# Patient Record
Sex: FEMALE | Race: WHITE | Employment: FULL TIME | URBAN - METROPOLITAN AREA
[De-identification: names, ages, dates, MRNs, and addresses within clinical notes are randomized per-mention and may not be internally consistent; named-entity substitution may affect disease eponyms.]

---

## 2018-03-16 ENCOUNTER — HOSPITAL ENCOUNTER (OUTPATIENT)
Dept: GENERAL RADIOLOGY | Age: 43
Discharge: HOME | End: 2018-03-18
Payer: COMMERCIAL

## 2018-03-16 DIAGNOSIS — Z12.31 VISIT FOR SCREENING MAMMOGRAM: ICD-10-CM

## 2018-03-16 PROCEDURE — 77063 BREAST TOMOSYNTHESIS BI: CPT

## 2019-04-25 ENCOUNTER — HOSPITAL ENCOUNTER (OUTPATIENT)
Dept: GENERAL RADIOLOGY | Age: 44
Discharge: HOME | End: 2019-04-27
Payer: COMMERCIAL

## 2019-04-25 DIAGNOSIS — Z12.39 SCREENING BREAST EXAMINATION: ICD-10-CM

## 2019-04-25 PROCEDURE — 77063 BREAST TOMOSYNTHESIS BI: CPT

## 2019-04-26 ENCOUNTER — TELEPHONE (OUTPATIENT)
Dept: ONCOLOGY | Age: 44
End: 2019-04-26

## 2019-04-26 NOTE — TELEPHONE ENCOUNTER
Call to patient in reference to her mammogram performed at [facility] on April 25, 2019. Instructed patient that the radiologist has recommended some additional breast imaging, in order to make a final determination/result. Informed her that a request for Rx has been faxed to the ordering physician. Once we receive the script a  from [facility] will contact her to schedule the additional imaging study/studies. Verbalizes understanding and is agreeable to proceed.        Lindsay Alejandre, RN, BSN, [address]

## 2019-05-03 ENCOUNTER — HOSPITAL ENCOUNTER (OUTPATIENT)
Dept: GENERAL RADIOLOGY | Age: 44
Discharge: HOME | End: 2019-05-05
Payer: COMMERCIAL

## 2019-05-03 DIAGNOSIS — R92.8 ABNORMAL MAMMOGRAM: ICD-10-CM

## 2019-05-03 PROCEDURE — 76642 ULTRASOUND BREAST LIMITED: CPT

## 2020-06-04 ENCOUNTER — HOSPITAL ENCOUNTER (OUTPATIENT)
Dept: GENERAL RADIOLOGY | Age: 45
Discharge: HOME | End: 2020-06-06
Payer: COMMERCIAL

## 2020-06-04 PROCEDURE — 77063 BREAST TOMOSYNTHESIS BI: CPT

## 2020-12-24 ENCOUNTER — HOSPITAL ENCOUNTER (EMERGENCY)
Age: 45
Discharge: HOME | End: 2020-12-24
Attending: EMERGENCY MEDICINE
Payer: COMMERCIAL

## 2020-12-24 ENCOUNTER — APPOINTMENT (OUTPATIENT)
Dept: CT IMAGING | Age: 45
End: 2020-12-24
Payer: COMMERCIAL

## 2020-12-24 ENCOUNTER — APPOINTMENT (OUTPATIENT)
Dept: GENERAL RADIOLOGY | Age: 45
End: 2020-12-24
Payer: COMMERCIAL

## 2020-12-24 VITALS
OXYGEN SATURATION: 96 % | SYSTOLIC BLOOD PRESSURE: 119 MMHG | RESPIRATION RATE: 17 BRPM | HEART RATE: 79 BPM | TEMPERATURE: 98 F | HEIGHT: 66 IN | DIASTOLIC BLOOD PRESSURE: 84 MMHG | WEIGHT: 173 LBS | BODY MASS INDEX: 27.8 KG/M2

## 2020-12-24 LAB
ALBUMIN SERPL-MCNC: 4.5 G/DL (ref 3.5–5.2)
ALP BLD-CCNC: 81 U/L (ref 35–104)
ALT SERPL-CCNC: 6 U/L (ref 0–32)
AMPHETAMINE SCREEN, URINE: NOT DETECTED
ANION GAP SERPL CALCULATED.3IONS-SCNC: 9 MMOL/L (ref 7–16)
AST SERPL-CCNC: 15 U/L (ref 0–31)
BACTERIA: ABNORMAL /HPF
BARBITURATE SCREEN URINE: NOT DETECTED
BASOPHILS ABSOLUTE: 0.07 E9/L (ref 0–0.2)
BASOPHILS RELATIVE PERCENT: 1 % (ref 0–2)
BENZODIAZEPINE SCREEN, URINE: NOT DETECTED
BILIRUB SERPL-MCNC: 0.2 MG/DL (ref 0–1.2)
BILIRUBIN URINE: NEGATIVE
BLOOD, URINE: NEGATIVE
BUN BLDV-MCNC: 11 MG/DL (ref 6–20)
CALCIUM SERPL-MCNC: 9.2 MG/DL (ref 8.6–10.2)
CANNABINOID SCREEN URINE: NOT DETECTED
CHLORIDE BLD-SCNC: 104 MMOL/L (ref 98–107)
CLARITY: CLEAR
CO2: 26 MMOL/L (ref 22–29)
COCAINE METABOLITE SCREEN URINE: NOT DETECTED
COLOR: YELLOW
CREAT SERPL-MCNC: 0.9 MG/DL (ref 0.5–1)
D DIMER: 467 NG/ML DDU
EOSINOPHILS ABSOLUTE: 0.25 E9/L (ref 0.05–0.5)
EOSINOPHILS RELATIVE PERCENT: 3.4 % (ref 0–6)
FENTANYL SCREEN, URINE: NOT DETECTED
GFR AFRICAN AMERICAN: >60
GFR NON-AFRICAN AMERICAN: >60 ML/MIN/1.73
GLUCOSE BLD-MCNC: 99 MG/DL (ref 74–99)
GLUCOSE URINE: NEGATIVE MG/DL
HCG, URINE, POC: NEGATIVE
HCT VFR BLD CALC: 40.5 % (ref 34–48)
HEMOGLOBIN: 13.8 G/DL (ref 11.5–15.5)
IMMATURE GRANULOCYTES #: 0.02 E9/L
IMMATURE GRANULOCYTES %: 0.3 % (ref 0–5)
KETONES, URINE: NEGATIVE MG/DL
LEUKOCYTE ESTERASE, URINE: ABNORMAL
LYMPHOCYTES ABSOLUTE: 2.25 E9/L (ref 1.5–4)
LYMPHOCYTES RELATIVE PERCENT: 30.8 % (ref 20–42)
Lab: NORMAL
Lab: NORMAL
MAGNESIUM: 2.1 MG/DL (ref 1.6–2.6)
MCH RBC QN AUTO: 33.1 PG (ref 26–35)
MCHC RBC AUTO-ENTMCNC: 34.1 % (ref 32–34.5)
MCV RBC AUTO: 97.1 FL (ref 80–99.9)
METHADONE SCREEN, URINE: NOT DETECTED
MONOCYTES ABSOLUTE: 0.54 E9/L (ref 0.1–0.95)
MONOCYTES RELATIVE PERCENT: 7.4 % (ref 2–12)
NEGATIVE QC PASS/FAIL: NORMAL
NEUTROPHILS ABSOLUTE: 4.17 E9/L (ref 1.8–7.3)
NEUTROPHILS RELATIVE PERCENT: 57.1 % (ref 43–80)
NITRITE, URINE: NEGATIVE
OPIATE SCREEN URINE: NOT DETECTED
OXYCODONE URINE: NOT DETECTED
PDW BLD-RTO: 11.8 FL (ref 11.5–15)
PH UA: 6 (ref 5–9)
PHENCYCLIDINE SCREEN URINE: NOT DETECTED
PLATELET # BLD: 270 E9/L (ref 130–450)
PMV BLD AUTO: 11 FL (ref 7–12)
POSITIVE QC PASS/FAIL: NORMAL
POTASSIUM SERPL-SCNC: 3.8 MMOL/L (ref 3.5–5)
PRO-BNP: 31 PG/ML (ref 0–125)
PROTEIN UA: NEGATIVE MG/DL
RBC # BLD: 4.17 E12/L (ref 3.5–5.5)
RBC UA: ABNORMAL /HPF (ref 0–2)
SODIUM BLD-SCNC: 139 MMOL/L (ref 132–146)
SPECIFIC GRAVITY UA: 1.01 (ref 1–1.03)
TOTAL PROTEIN: 7.3 G/DL (ref 6.4–8.3)
TROPONIN: <0.01 NG/ML (ref 0–0.03)
UROBILINOGEN, URINE: 0.2 E.U./DL
WBC # BLD: 7.3 E9/L (ref 4.5–11.5)
WBC UA: ABNORMAL /HPF (ref 0–5)

## 2020-12-24 PROCEDURE — 80307 DRUG TEST PRSMV CHEM ANLYZR: CPT

## 2020-12-24 PROCEDURE — 81001 URINALYSIS AUTO W/SCOPE: CPT

## 2020-12-24 PROCEDURE — 70450 CT HEAD/BRAIN W/O DYE: CPT

## 2020-12-24 PROCEDURE — 71275 CT ANGIOGRAPHY CHEST: CPT

## 2020-12-24 PROCEDURE — 83880 ASSAY OF NATRIURETIC PEPTIDE: CPT

## 2020-12-24 PROCEDURE — 83735 ASSAY OF MAGNESIUM: CPT

## 2020-12-24 PROCEDURE — 6360000004 HC RX CONTRAST MEDICATION: Performed by: RADIOLOGY

## 2020-12-24 PROCEDURE — 99284 EMERGENCY DEPT VISIT MOD MDM: CPT

## 2020-12-24 PROCEDURE — 93005 ELECTROCARDIOGRAM TRACING: CPT | Performed by: NURSE PRACTITIONER

## 2020-12-24 PROCEDURE — 84484 ASSAY OF TROPONIN QUANT: CPT

## 2020-12-24 PROCEDURE — 80053 COMPREHEN METABOLIC PANEL: CPT

## 2020-12-24 PROCEDURE — 85378 FIBRIN DEGRADE SEMIQUANT: CPT

## 2020-12-24 PROCEDURE — 85025 COMPLETE CBC W/AUTO DIFF WBC: CPT

## 2020-12-24 PROCEDURE — 71046 X-RAY EXAM CHEST 2 VIEWS: CPT

## 2020-12-24 RX ORDER — SODIUM CHLORIDE 0.9 % (FLUSH) 0.9 %
10 SYRINGE (ML) INJECTION PRN
Status: DISCONTINUED | OUTPATIENT
Start: 2020-12-24 | End: 2020-12-24 | Stop reason: HOSPADM

## 2020-12-24 RX ADMIN — IOPAMIDOL 75 ML: 755 INJECTION, SOLUTION INTRAVENOUS at 19:28

## 2020-12-24 ASSESSMENT — PAIN SCALES - GENERAL: PAINLEVEL_OUTOF10: 4

## 2020-12-24 ASSESSMENT — PAIN DESCRIPTION - DESCRIPTORS: DESCRIPTORS: CONSTANT;DISCOMFORT;DULL

## 2020-12-24 ASSESSMENT — PAIN DESCRIPTION - LOCATION: LOCATION: CHEST

## 2020-12-24 ASSESSMENT — PAIN DESCRIPTION - PAIN TYPE: TYPE: ACUTE PAIN

## 2020-12-24 NOTE — ED PROVIDER NOTES
ED Attending  CC: No    [facility]:  12/24/20, Time: 5:28 PM [place] Ballard is a 50 y.o. female presenting to the ED for paresthesia, beginning yesterday ago. The complaint has been persistent, mild in severity, and worsened by nothing. Presents with complaints of body wide paresthesia which she states began yesterday. Denies any fall or injury. Denies any headache, blurred vision or double vision. States he is having some mild chest heaviness. Denies any recent cough or cold symptoms. States has never had anything similar to this in the past.  Denies any alcohol or substance abuse. ROS:   Pertinent positives and negatives are stated within HPI, all other systems reviewed and are negative.  --------------------------------------------- PAST HISTORY ---------------------------------------------  Past Medical History:  has no past medical history on file. Past Surgical History:  has no past surgical history on file. Social History:  reports that she quit smoking about 5 years ago. Her smoking use included cigarettes. She has never used smokeless tobacco.    Family History: family history is not on file. The patients home medications have been reviewed. Allergies: Zantac [ranitidine]    ---------------------------------------------------PHYSICAL EXAM--------------------------------------    Constitutional/General: Alert and oriented x3, well appearing, non toxic in NAD  Head: Normocephalic and atraumatic  Eyes: PERRL, EOMI  Mouth: Oropharynx clear, handling secretions, no trismus  Neck: Supple, full ROM, non tender to palpation in the midline, no stridor, no crepitus, no meningeal signs  Pulmonary: Lungs clear to auscultation bilaterally, no wheezes, rales, or rhonchi. Not in respiratory distress  Cardiovascular:  Regular rate. Regular rhythm. No murmurs, gallops, or rubs. 2+ distal pulses  Chest: no chest wall tenderness  Abdomen: Soft. Non tender. Non distended. +BS.   No rebound, guarding, or rigidity. No pulsatile masses appreciated. Musculoskeletal: Moves all extremities x 4. Warm and well perfused, no clubbing, cyanosis, or edema. Capillary refill <3 seconds  Skin: warm and dry. No rashes. Neurologic: GCS 15, CN 2-12 grossly intact, no focal deficits, symmetric strength 5/5 in the upper and lower extremities bilaterally  Psych: Normal Affect    -------------------------------------------------- RESULTS -------------------------------------------------  I have personally reviewed all laboratory and imaging results for this patient. Results are listed below.      LABS:  Results for orders placed or performed during the hospital encounter of 12/24/20   Troponin   Result Value Ref Range    Troponin <0.01 0.00 - 0.03 ng/mL   CBC Auto Differential   Result Value Ref Range    WBC 7.3 4.5 - 11.5 E9/L    RBC 4.17 3.50 - 5.50 E12/L    Hemoglobin 13.8 11.5 - 15.5 g/dL    Hematocrit 40.5 34.0 - 48.0 %    MCV 97.1 80.0 - 99.9 fL    MCH 33.1 26.0 - 35.0 pg    MCHC 34.1 32.0 - 34.5 %    RDW 11.8 11.5 - 15.0 fL    Platelets 722 192 - 227 E9/L    MPV 11.0 7.0 - 12.0 fL    Neutrophils % 57.1 43.0 - 80.0 %    Immature Granulocytes % 0.3 0.0 - 5.0 %    Lymphocytes % 30.8 20.0 - 42.0 %    Monocytes % 7.4 2.0 - 12.0 %    Eosinophils % 3.4 0.0 - 6.0 %    Basophils % 1.0 0.0 - 2.0 %    Neutrophils Absolute 4.17 1.80 - 7.30 E9/L    Immature Granulocytes # 0.02 E9/L    Lymphocytes Absolute 2.25 1.50 - 4.00 E9/L    Monocytes Absolute 0.54 0.10 - 0.95 E9/L    Eosinophils Absolute 0.25 0.05 - 0.50 E9/L    Basophils Absolute 0.07 0.00 - 0.20 E9/L   Comprehensive Metabolic Panel   Result Value Ref Range    Sodium 139 132 - 146 mmol/L    Potassium 3.8 3.5 - 5.0 mmol/L    Chloride 104 98 - 107 mmol/L    CO2 26 22 - 29 mmol/L    Anion Gap 9 7 - 16 mmol/L    Glucose 99 74 - 99 mg/dL    BUN 11 6 - 20 mg/dL    CREATININE 0.9 0.5 - 1.0 mg/dL    GFR Non-African American >60 >=60 mL/min/1.73    GFR African American >60 Calcium 9.2 8.6 - 10.2 mg/dL    Total Protein 7.3 6.4 - 8.3 g/dL    Alb 4.5 3.5 - 5.2 g/dL    Total Bilirubin 0.2 0.0 - 1.2 mg/dL    Alkaline Phosphatase 81 35 - 104 U/L    ALT 6 0 - 32 U/L    AST 15 0 - 31 U/L   D-Dimer, Quantitative   Result Value Ref Range    D-Dimer, Quant 467 ng/mL DDU   Brain Natriuretic Peptide   Result Value Ref Range    Pro-BNP 31 0 - 125 pg/mL   Magnesium   Result Value Ref Range    Magnesium 2.1 1.6 - 2.6 mg/dL   Urinalysis   Result Value Ref Range    Color, UA Yellow Straw/Yellow    Clarity, UA Clear Clear    Glucose, Ur Negative Negative mg/dL    Bilirubin Urine Negative Negative    Ketones, Urine Negative Negative mg/dL    Specific Gravity, UA 1.010 1.005 - 1.030    Blood, Urine Negative Negative    pH, UA 6.0 5.0 - 9.0    Protein, UA Negative Negative mg/dL    Urobilinogen, Urine 0.2 <2.0 E.U./dL    Nitrite, Urine Negative Negative    Leukocyte Esterase, Urine MODERATE (A) Negative   Urine Drug Screen   Result Value Ref Range    Amphetamine Screen, Urine NOT DETECTED Negative <1000 ng/mL    Barbiturate Screen, Ur NOT DETECTED Negative < 200 ng/mL    Benzodiazepine Screen, Urine NOT DETECTED Negative < 200 ng/mL    Cannabinoid Scrn, Ur NOT DETECTED Negative < 50ng/mL    Cocaine Metabolite Screen, Urine NOT DETECTED Negative < 300 ng/mL    Opiate Scrn, Ur NOT DETECTED Negative < 300ng/mL    PCP Screen, Urine NOT DETECTED Negative < 25 ng/mL    Methadone Screen, Urine NOT DETECTED Negative <300 ng/mL    Oxycodone Urine NOT DETECTED Negative <100 ng/mL    FENTANYL SCREEN, URINE NOT DETECTED Negative <1 ng/mL    Drug Screen Comment: see below    Microscopic Urinalysis   Result Value Ref Range    WBC, UA 2-5 0 - 5 /HPF    RBC, UA 0-1 0 - 2 /HPF    Bacteria, UA FEW (A) None Seen /HPF   POC Pregnancy Urine   Result Value Ref Range    HCG, Urine, POC Negative Negative    Lot Number [id number]     Positive QC Pass/Fail Pass     Negative QC Pass/Fail Pass        RADIOLOGY:  Interpreted by Radiologist.  CTA CHEST W CONTRAST   Final Result   No central pulmonary embolism or aortic dissection or pulmonary infiltrates. There is minimal atelectasis in the lung bases. 1.4 x 1.6 cm indeterminate low-attenuation lesion in the pancreatic body for   which MRI surveillance recommended. Asymmetric soft tissue density in the right breast.  Consider mammography. CT HEAD WO CONTRAST   Final Result   No acute intracranial abnormality. XR CHEST (2 VW)   Final Result   Normal chest.            EKG Interpretation  Interpreted by emergency department physician    Rhythm: normal sinus   Rate: normal  Axis: normal  Conduction: normal  ST Segments: no acute change  T Waves: no acute change    Clinical Impression: no acute changes  Comparison to prior EKG: stable as compared to patient's most recent EKG      ------------------------- NURSING NOTES AND VITALS REVIEWED ---------------------------   The nursing notes within the ED encounter and vital signs as below have been reviewed by myself. /84   Pulse 79   Temp 98 °F (36.7 °C)   Resp 17   Ht 5' 6\" (1.676 m)   Wt 173 lb (78.5 kg)   LMP 11/10/2020 (Approximate)   SpO2 96%   BMI 27.92 kg/m²   Oxygen Saturation Interpretation: Normal    The patients available past medical records and past encounters were reviewed. ------------------------------ ED COURSE/MEDICAL DECISION MAKING----------------------  Medications   sodium chloride flush 0.9 % injection 10 mL (has no administration in time range)   iopamidol (ISOVUE-370) 76 % injection 75 mL (75 mLs Intravenous Given 12/24/20 1928)             Medical Decision Making:    Patient was examined by Dr. Lois Avery we will continue to monitor and reevaluate. Time: 1955  Re-evaluation. Patients symptoms are improving  Repeat physical examination is improved, states her symptoms are mildly improved.   She was informed of all normal labs, urinalysis negative CAT scan of the head negative CAT scan of the chest and normal EKG. Plan will be for discharge to home with instructions to follow-up with primary care physician may need to have an outpatient nonemergent referral to neurology for further evaluation if her symptoms persist.  If any change or worsening symptoms she is advised to return back to the emergency room. Re-Evaluations:             Re-evaluation. Patients symptoms are improving      Consultations:                 Critical Care: This patient's ED course included: a personal history and physicial examination, re-evaluation prior to disposition, multiple bedside re-evaluations and a personal history and physicial eaxmination    This patient has remained hemodynamically stable and improved during their ED course. Counseling: The emergency provider has spoken with the patient and discussed todays results, in addition to providing specific details for the plan of care and counseling regarding the diagnosis and prognosis. Questions are answered at this time and they are agreeable with the plan.       --------------------------------- IMPRESSION AND DISPOSITION ---------------------------------    IMPRESSION  1. Paresthesia    2. Chest pain, unspecified type        DISPOSITION  Disposition: Discharge to home  Patient condition is good        NOTE: This report was transcribed using voice recognition software.  Every effort was made to ensure accuracy; however, inadvertent computerized transcription errors may be present         LENORE Acevedo CNP  12/24/20 1959

## 2020-12-24 NOTE — ED NOTES
Bed: 13  Expected date:   Expected time:   Means of arrival:   Comments:  triage     Shayne Barber RN  12/24/20 [id number]

## 2020-12-25 LAB
EKG ATRIAL RATE: 84 BPM
EKG P AXIS: 47 DEGREES
EKG P-R INTERVAL: 154 MS
EKG Q-T INTERVAL: 366 MS
EKG QRS DURATION: 80 MS
EKG QTC CALCULATION (BAZETT): 432 MS
EKG R AXIS: 39 DEGREES
EKG T AXIS: 22 DEGREES
EKG VENTRICULAR RATE: 84 BPM

## 2020-12-25 PROCEDURE — 93010 ELECTROCARDIOGRAM REPORT: CPT | Performed by: INTERNAL MEDICINE

## 2020-12-25 NOTE — ED NOTES
Pt returned from [address] [facility], [facility]  12/24/20 1932

## 2020-12-31 ENCOUNTER — HOSPITAL ENCOUNTER (OUTPATIENT)
Dept: GENERAL RADIOLOGY | Age: 45
Discharge: HOME | End: 2021-01-02
Payer: COMMERCIAL

## 2020-12-31 DIAGNOSIS — D48.61 NEOPLASM OF UNCERTAIN BEHAVIOR OF RIGHT BREAST: ICD-10-CM

## 2020-12-31 PROCEDURE — G0279 TOMOSYNTHESIS, MAMMO: HCPCS

## 2021-01-09 ENCOUNTER — HOSPITAL ENCOUNTER (OUTPATIENT)
Dept: MRI IMAGING | Age: 46
Discharge: HOME | End: 2021-01-11
Payer: COMMERCIAL

## 2021-01-09 DIAGNOSIS — D37.8 NEOPLASM OF UNCERTAIN BEHAVIOR OF PANCREAS: ICD-10-CM

## 2021-01-09 PROCEDURE — A9579 GAD-BASE MR CONTRAST NOS,1ML: HCPCS | Performed by: RADIOLOGY

## 2021-01-09 PROCEDURE — 6360000004 HC RX CONTRAST MEDICATION: Performed by: RADIOLOGY

## 2021-01-09 PROCEDURE — 74183 MRI ABD W/O CNTR FLWD CNTR: CPT

## 2021-01-09 RX ADMIN — GADOTERIDOL 16 ML: 279.3 INJECTION, SOLUTION INTRAVENOUS at 13:26

## 2021-01-26 ENCOUNTER — OFFICE VISIT (OUTPATIENT)
Dept: SURGERY | Age: 46
End: 2021-01-26
Payer: COMMERCIAL

## 2021-01-26 VITALS
WEIGHT: 178 LBS | DIASTOLIC BLOOD PRESSURE: 79 MMHG | BODY MASS INDEX: 28.61 KG/M2 | TEMPERATURE: 98.4 F | HEIGHT: 66 IN | HEART RATE: 93 BPM | RESPIRATION RATE: 16 BRPM | SYSTOLIC BLOOD PRESSURE: 135 MMHG

## 2021-01-26 DIAGNOSIS — K86.2 PANCREATIC CYST: Primary | ICD-10-CM

## 2021-01-26 PROCEDURE — 99204 OFFICE O/P NEW MOD 45 MIN: CPT | Performed by: TRANSPLANT SURGERY

## 2021-01-26 RX ORDER — M-VIT,TX,IRON,MINS/CALC/FOLIC 27MG-0.4MG
1 TABLET ORAL DAILY
COMMUNITY

## 2021-01-26 RX ORDER — OMEPRAZOLE 20 MG/1
20 CAPSULE, DELAYED RELEASE ORAL NIGHTLY
COMMUNITY

## 2021-01-26 SDOH — HEALTH STABILITY: MENTAL HEALTH: HOW MANY STANDARD DRINKS CONTAINING ALCOHOL DO YOU HAVE ON A TYPICAL DAY?: 3 OR 4

## 2021-01-26 ASSESSMENT — ENCOUNTER SYMPTOMS
BACK PAIN: 0
ABDOMINAL PAIN: 1
CONSTIPATION: 0
NAUSEA: 1
SHORTNESS OF BREATH: 0
EYE DISCHARGE: 0
PHOTOPHOBIA: 0
VOMITING: 0
BLOOD IN STOOL: 0
EYE PAIN: 0
DIARRHEA: 0

## 2021-01-26 NOTE — PROGRESS NOTES
Hepatobiliary and Pancreatic Surgery Attending History and Physical    Patient's Name/Date of Birth: Kendrick Turner /1975 (81 y.o.)    Date: January 26, 2021     CC: 1.9cm pancreatic body septated cyst    HPI:  Patient is a very pleasant 50year old female whom was having chest pressure. She presented to the emergency department where she had a CTA performed. She was found to have a cystic mass in her pancreatic body. She then underwent a MRI which Dr. Alexandra Cruz which showed a septated pancreatic body cyst.  She denies any weightloss. She does have GERD and feels some upper abdominal pressure but not really any pain. She denies a change in her blood sugars. She has never had pancreatitis . She thinks it was there on a CT in 2009. She denies any history of pancreatic cancer. Past Medical History:   Diagnosis Date    GERD (gastroesophageal reflux disease)        Past Surgical History:   Procedure Laterality Date    CHOLECYSTECTOMY      GASTRIC FUNDOPLICATION      UPPER GASTROINTESTINAL ENDOSCOPY         Current Outpatient Medications   Medication Sig Dispense Refill    omeprazole (PRILOSEC) 20 MG delayed release capsule Take 20 mg by mouth 2 times daily      Multiple Vitamins-Minerals (THERAPEUTIC MULTIVITAMIN-MINERALS) tablet Take 1 tablet by mouth daily      NONFORMULARY Take 1 capsule by mouth 2 times daily PROBIOTIC       No current facility-administered medications for this visit. Allergies   Allergen Reactions    Zantac [Ranitidine] Rash       No family history on file.     Social History     Socioeconomic History    Marital status:      Spouse name: Not on file    Number of children: Not on file    Years of education: Not on file    Highest education level: Not on file   Occupational History    Not on file   Social Needs    Financial resource strain: Not on file    Food insecurity     Worry: Not on file     Inability: Not on file    Transportation needs     Medical: Not on file     Non-medical: Not on file   Tobacco Use    Smoking status: Former Smoker     Types: Cigarettes     Quit date:      Years since quittin.0    Smokeless tobacco: Never Used   Substance and Sexual Activity    Alcohol use: Yes     Frequency: 2-4 times a month     Drinks per session: 3 or 4     Binge frequency: Weekly    Drug use: Never    Sexual activity: Not on file   Lifestyle    Physical activity     Days per week: Not on file     Minutes per session: Not on file    Stress: Not on file   Relationships    Social connections     Talks on phone: Not on file     Gets together: Not on file     Attends [employer] service: Not on file     Active member of club or organization: Not on file     Attends meetings of clubs or organizations: Not on file     Relationship status: Not on file    Intimate partner violence     Fear of current or ex partner: Not on file     Emotionally abused: Not on file     Physically abused: Not on file     Forced sexual activity: Not on file   Other Topics Concern    Not on file   Social History Narrative    Not on file       ROS:   Review of Systems   Constitutional: Negative for chills, diaphoresis and fever. HENT: Negative for congestion, ear discharge, ear pain, hearing loss, nosebleeds and tinnitus. Eyes: Negative for photophobia, pain and discharge. Respiratory: Negative for shortness of breath. Cardiovascular: Negative for palpitations and leg swelling. Gastrointestinal: Positive for abdominal pain and nausea. Negative for blood in stool, constipation, diarrhea and vomiting. Endocrine: Negative for polydipsia. Genitourinary: Negative for frequency, hematuria and urgency. Musculoskeletal: Negative for back pain and neck pain. Skin: Negative for rash. Allergic/Immunologic: Negative for environmental allergies. Neurological: Negative for tremors and seizures. Psychiatric/Behavioral: Negative for hallucinations and suicidal ideas.  The

## 2021-01-27 ENCOUNTER — TELEPHONE (OUTPATIENT)
Dept: HEMATOLOGY | Age: 46
End: 2021-01-27

## 2021-01-27 NOTE — TELEPHONE ENCOUNTER
I called [employer] and no auth needed for cpt code 62501 with [id number] per [facility]. I called and scheduled patient for an EUS with Dr. Dalton Collado on 2/12/21 at 11:00am at [facility]. I gave patient this appt information and instructions for covid testing on 2/5/21 with times and locations for testing at her appt on 1/27/21. I did tell her NPO after midnight the night prior to her EUS and I made her a follow up on 2/23/21 at 3:30pm at the [facility] office. She verbalized understanding and confirmed these appts.     Electronically signed by Obdulia Ayon RN on 1/27/2021 at 9:27 AM

## 2021-02-04 NOTE — PROGRESS NOTES
PATIENT AGREES TO COVID TESTING TO BE DONE @ [facility], AGREES TO SELF ISOLATE UNTIL SURGERY DAY.

## 2021-02-08 ENCOUNTER — HOSPITAL ENCOUNTER (OUTPATIENT)
Age: 46
Discharge: HOME | End: 2021-02-10
Payer: COMMERCIAL

## 2021-02-08 DIAGNOSIS — U07.1 COVID-19: ICD-10-CM

## 2021-02-08 LAB — SARS-COV-2, PCR: NOT DETECTED

## 2021-02-08 PROCEDURE — U0003 INFECTIOUS AGENT DETECTION BY NUCLEIC ACID (DNA OR RNA); SEVERE ACUTE RESPIRATORY SYNDROME CORONAVIRUS 2 (SARS-COV-2) (CORONAVIRUS DISEASE [COVID-19]), AMPLIFIED PROBE TECHNIQUE, MAKING USE OF HIGH THROUGHPUT TECHNOLOGIES AS DESCRIBED BY CMS-2020-01-R: HCPCS

## 2021-02-09 NOTE — PROGRESS NOTES
[address] PRE-ADMISSION TESTING GENERAL INSTRUCTIONS- [facility]-phone number:[phone]    GENERAL INSTRUCTIONS  [x] Antibacterial Soap shower Night before and/or AM of Surgery    [x] Nothing by mouth after midnight, including gum, candy, mints, or water. [x] You may brush your teeth, gargle, but do NOT swallow water. [x]No smoking, chewing tobacco, illegal drugs, or alcohol within 24 hours of your surgery. [x] Jewelry, valuables or body piercing's should not be brought to the hospital. All body and/or tongue piercing's must be removed prior to arriving to hospital.  ALL hair pins must be removed. [x] Do not wear makeup, lotions, powders, deodorant. Nail polish as directed by the nurse. [x] Arrange transportation with a responsible adult  to and from the hospital. If you do not have a responsible adult  to transport you, you will need to make arrangements with a medical transportation company (i.e. [employer]. A Uber/taxi/bus is not appropriate unless you are accompanied by a responsible adult ). Arrange for someone to be with you for the remainder of the day and for 24 hours after your procedure due to having had anesthesia. Who will be your  for transportation? [employer] Yazidi, spouse  Who will be staying with you for 24 hrs after your procedure? [employer] Yazidi, spouse  [x] [employer] card and photo ID. [x] Bring urine specimen day of surgery. Any small container is acceptable. PARKING INSTRUCTIONS:   [x] Arrival Time:9:45 am Park on [address], enter the main entrance. One person may accompany you. Wear a mask. [x] To reach the [facility] from [address] [place] [address], upon entering the hospital, take elevator B to the 3rd floor. Check in with the . A parking token will be provided if needed. EDUCATION INSTRUCTIONS:          [x]Pain: Post-op pain is normal and to be expected.   You will be asked to rate your pain from 0-10 (a zero is not acceptable-education is needed). Your post-op pain goal is:    [x] Other:  Wear loose comfortable clothing    MEDICATION INSTRUCTIONS:  [x]Bring a complete list of your medications, please write the last time you took the medicine, give this list to the nurse. [x] Take the following medications the morning of surgery with 1-2 ounces of water: omeprazole  [x] Stop herbal supplements and vitamins 5 days before your surgery. [] Follow physician instructions regarding any blood thinners you may be taking. WHAT TO EXPECT:  [x] The day of surgery you will be greeted and checked in by the [employer]. Please bring your photo ID and insurance card. A nurse will greet you in accordance to the time you are needed in the pre-op area to prepare you for surgery. Please do not be discouraged if you are not greeted in the order you arrive as there are many variables that are involved in patient preparation. Your patience is greatly appreciated as you wait for your nurse. Please bring in items such as: books, magazines, newspapers, electronics, or any other items  to occupy your time in the waiting area. [x]  Delays may occur with surgery and staff will make a sincere effort to keep you informed of delays. If any delays occur with your procedure, we apologize ahead of time for your inconvenience as we recognize the value of your time.

## 2021-02-12 ENCOUNTER — ANESTHESIA EVENT (OUTPATIENT)
Dept: ENDOSCOPY | Age: 46
End: 2021-02-12
Payer: COMMERCIAL

## 2021-02-12 ENCOUNTER — ANESTHESIA (OUTPATIENT)
Dept: ENDOSCOPY | Age: 46
End: 2021-02-12
Payer: COMMERCIAL

## 2021-02-12 ENCOUNTER — HOSPITAL ENCOUNTER (OUTPATIENT)
Age: 46
Setting detail: OUTPATIENT SURGERY
Discharge: HOME | End: 2021-02-12
Attending: INTERNAL MEDICINE | Admitting: INTERNAL MEDICINE
Payer: COMMERCIAL

## 2021-02-12 VITALS — OXYGEN SATURATION: 96 % | DIASTOLIC BLOOD PRESSURE: 71 MMHG | SYSTOLIC BLOOD PRESSURE: 103 MMHG

## 2021-02-12 VITALS
BODY MASS INDEX: 28.61 KG/M2 | WEIGHT: 178 LBS | RESPIRATION RATE: 16 BRPM | DIASTOLIC BLOOD PRESSURE: 74 MMHG | SYSTOLIC BLOOD PRESSURE: 113 MMHG | OXYGEN SATURATION: 100 % | HEART RATE: 67 BPM | HEIGHT: 66 IN | TEMPERATURE: 98 F

## 2021-02-12 DIAGNOSIS — Z01.818 PRE-OP TESTING: ICD-10-CM

## 2021-02-12 DIAGNOSIS — U07.1 COVID-19: Primary | ICD-10-CM

## 2021-02-12 LAB
HCG, URINE, POC: NEGATIVE
Lab: NORMAL
NEGATIVE QC PASS/FAIL: NORMAL
POSITIVE QC PASS/FAIL: NORMAL

## 2021-02-12 PROCEDURE — 3609018500 HC EGD US SCOPE W/ADJACENT STRUCTURES: Performed by: INTERNAL MEDICINE

## 2021-02-12 PROCEDURE — 7100000010 HC PHASE II RECOVERY - FIRST 15 MIN: Performed by: INTERNAL MEDICINE

## 2021-02-12 PROCEDURE — 2580000003 HC RX 258: Performed by: NURSE ANESTHETIST, CERTIFIED REGISTERED

## 2021-02-12 PROCEDURE — 2709999900 HC NON-CHARGEABLE SUPPLY: Performed by: INTERNAL MEDICINE

## 2021-02-12 PROCEDURE — 3609017100 HC EGD: Performed by: INTERNAL MEDICINE

## 2021-02-12 PROCEDURE — 3700000000 HC ANESTHESIA ATTENDED CARE: Performed by: INTERNAL MEDICINE

## 2021-02-12 PROCEDURE — 43242 EGD US FINE NEEDLE BX/ASPIR: CPT | Performed by: INTERNAL MEDICINE

## 2021-02-12 PROCEDURE — 6360000002 HC RX W HCPCS: Performed by: NURSE ANESTHETIST, CERTIFIED REGISTERED

## 2021-02-12 PROCEDURE — 3700000001 HC ADD 15 MINUTES (ANESTHESIA): Performed by: INTERNAL MEDICINE

## 2021-02-12 PROCEDURE — 7100000011 HC PHASE II RECOVERY - ADDTL 15 MIN: Performed by: INTERNAL MEDICINE

## 2021-02-12 RX ORDER — SODIUM CHLORIDE 0.9 % (FLUSH) 0.9 %
10 SYRINGE (ML) INJECTION PRN
Status: CANCELLED | OUTPATIENT
Start: 2021-02-12

## 2021-02-12 RX ORDER — SODIUM CHLORIDE 0.9 % (FLUSH) 0.9 %
10 SYRINGE (ML) INJECTION EVERY 12 HOURS SCHEDULED
Status: CANCELLED | OUTPATIENT
Start: 2021-02-12

## 2021-02-12 RX ORDER — PROPOFOL 10 MG/ML
INJECTION, EMULSION INTRAVENOUS PRN
Status: DISCONTINUED | OUTPATIENT
Start: 2021-02-12 | End: 2021-02-12 | Stop reason: SDUPTHER

## 2021-02-12 RX ORDER — SODIUM CHLORIDE 9 MG/ML
INJECTION, SOLUTION INTRAVENOUS CONTINUOUS PRN
Status: DISCONTINUED | OUTPATIENT
Start: 2021-02-12 | End: 2021-02-12 | Stop reason: SDUPTHER

## 2021-02-12 RX ADMIN — SODIUM CHLORIDE: 9 INJECTION, SOLUTION INTRAVENOUS at 10:32

## 2021-02-12 RX ADMIN — PROPOFOL 340 MG: 10 INJECTION, EMULSION INTRAVENOUS at 11:03

## 2021-02-12 ASSESSMENT — PAIN - FUNCTIONAL ASSESSMENT: PAIN_FUNCTIONAL_ASSESSMENT: 0-10

## 2021-02-12 NOTE — OP NOTE
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 female. The risks, benefits, complications, treatment options and expected outcomes were discussed with the patient. The possibilities of reaction to medication, pulmonary aspiration, perforation of the gastrointestinal tract, bleeding requiring transfusion or operation, respiratory failure requiring placement on a ventilator and failure to diagnose a condition were discussed with the patient who freely signed the consent. Description of Procedure: The patient was taken to the endoscopy suite, identified as Odetta Lines and the procedure verified as Endoscopic Ultrasound (EUS). A Time Out was held and the above information confirmed. The patient was positioned in the left lateral position with an oral bite block and anesthesia was provided for sedation and comfort. The endoscope was passed through the mouth to the second portion of the duodenum. Following the routine endoscopy, the echoendoscope was passed to the second portion of the duodenum. EGD/EUS findings:   Esophagus: normal   Stomach: normal fundoplication   Duodenum: normal    Pancreas: the gland is normal except for a 19 mm cystic lesion in the pancreatic body abutting the posterior wall and splenic artery; no nodules or solid mass was seen. The main pancreatic duct was normal to the tail of the gland   Bile Duct: normal   Gallbladder: surgically absent       Specimens:  1.  None     The Patient was taken to the Endoscopy Recovery area in satisfactory condition.       Electronically signed by Calvin Leblanc DO on 2/12/2021 at 10:56 AM

## 2021-02-12 NOTE — H&P
Update History & Physical    The patient's History and Physical of January 26, 2021 was reviewed with the patient and I examined the patient. There was no change. The surgical site was confirmed by the patient and me. Plan: The risks, benefits, expected outcome, and alternative to the recommended procedure have been discussed with the patient. Patient understands and wants to proceed with the procedure.      Electronically signed by Diamond Araiza DO on 2/12/2021 at 10:46 AM

## 2021-02-12 NOTE — ANESTHESIA POSTPROCEDURE EVALUATION
Department of Anesthesiology  Postprocedure Note    Patient: Bryanna Lei  MRN: [id number]  YOB: 1975  Date of evaluation: 2/12/2021  Time:  12:40 PM     Procedure Summary     Date: 02/12/21 Room / Location: [facility] 03 / [facility]    Anesthesia Start:  Anesthesia Stop:     Procedures:       ENDOSCOPIC ULTRASOUND (N/A )      EGD (N/A ) Diagnosis: (PANCREATIC CYST)    Surgeons: Chloe Styles DO Responsible Provider:     Anesthesia Type: MAC ASA Status: 1          Anesthesia Type: MAC    Mary Phase I: Mary Score: 10    Mary Phase II: Mary Score: 10    Last vitals: Reviewed and per EMR flowsheets.        Anesthesia Post Evaluation    Patient location during evaluation: PACU  Patient participation: complete - patient participated  Level of consciousness: awake  Pain score: 3  Airway patency: patent  Nausea & Vomiting: no nausea and no vomiting  Complications: no  Cardiovascular status: blood pressure returned to baseline  Respiratory status: acceptable  Hydration status: euvolemic

## 2021-02-12 NOTE — ANESTHESIA PRE PROCEDURE
Department of Anesthesiology  Preprocedure Note       Name:  Lex Jones   Age:  50 y.o.  :  1975                                          MRN:  [id number]         Date:  2021      Surgeon: Ela Hernandez):  Danette Sigala DO    Procedure: Procedure(s):  ENDOSCOPIC ULTRASOUND  EGD    Medications prior to admission:   Prior to Admission medications    Medication Sig Start Date End Date Taking? Authorizing Provider   omeprazole (PRILOSEC) 20 MG delayed release capsule Take 20 mg by mouth 2 times daily   Yes Historical Provider, MD   Multiple Vitamins-Minerals (THERAPEUTIC MULTIVITAMIN-MINERALS) tablet Take 1 tablet by mouth daily   Yes Historical Provider, MD   NONFORMULARY Take 1 capsule by mouth 2 times daily PROBIOTIC   Yes Historical Provider, MD       Current medications:    No current facility-administered medications for this encounter. Allergies: Allergies   Allergen Reactions    Zantac [Ranitidine] Rash       Problem List:  There is no problem list on file for this patient. Past Medical History:        Diagnosis Date    GERD (gastroesophageal reflux disease)        Past Surgical History:        Procedure Laterality Date    CHOLECYSTECTOMY      GASTRIC FUNDOPLICATION      INGUINAL HERNIA REPAIR Left  approx    KNEE ARTHROSCOPY Right approx     UPPER GASTROINTESTINAL ENDOSCOPY         Social History:    Social History     Tobacco Use    Smoking status: Former Smoker     Types: Cigarettes     Quit date:      Years since quittin.1    Smokeless tobacco: Never Used   Substance Use Topics    Alcohol use:  Yes     Alcohol/week: 3.0 - 4.0 standard drinks     Types: 3 - 4 Cans of beer per week     Frequency: 2-4 times a month     Drinks per session: 3 or 4     Binge frequency: Weekly                                Counseling given: Not Answered      Vital Signs (Current):   Vitals:    21 1358 21 0939 21 0948   BP:   (!) 148/91   Pulse:   97   Resp: 20   Temp:   97.9 °F (36.6 °C)   TempSrc:   Temporal   SpO2:   100%   Weight: 178 lb (80.7 kg) 178 lb (80.7 kg)    Height: 5' 6\" (1.676 m) 5' 6\" (1.676 m)                                               BP Readings from Last 3 Encounters:   02/12/21 (!) 148/91   01/26/21 135/79   12/24/20 119/84       NPO Status: Time of last liquid consumption: 2345                        Time of last solid consumption: 1900                        Date of last liquid consumption: 02/11/21                        Date of last solid food consumption: 02/11/21    BMI:   Wt Readings from Last 3 Encounters:   02/12/21 178 lb (80.7 kg)   01/26/21 178 lb (80.7 kg)   12/24/20 173 lb (78.5 kg)     Body mass index is 28.73 kg/m². CBC:   Lab Results   Component Value Date    WBC 7.3 12/24/2020    RBC 4.17 12/24/2020    HGB 13.8 12/24/2020    HCT 40.5 12/24/2020    MCV 97.1 12/24/2020    RDW 11.8 12/24/2020     12/24/2020       CMP:   Lab Results   Component Value Date     12/24/2020    K 3.8 12/24/2020     12/24/2020    CO2 26 12/24/2020    BUN 11 12/24/2020    CREATININE 0.9 12/24/2020    GFRAA >60 12/24/2020    LABGLOM >60 12/24/2020    GLUCOSE 99 12/24/2020    PROT 7.3 12/24/2020    CALCIUM 9.2 12/24/2020    BILITOT 0.2 12/24/2020    ALKPHOS 81 12/24/2020    AST 15 12/24/2020    ALT 6 12/24/2020       POC Tests: No results for input(s): POCGLU, POCNA, POCK, POCCL, POCBUN, POCHEMO, POCHCT in the last 72 hours.     Coags: No results found for: PROTIME, INR, APTT    HCG (If Applicable): No results found for: PREGTESTUR, PREGSERUM, HCG, HCGQUANT     ABGs: No results found for: PHART, PO2ART, KXR6BSC, XRY2DSV, BEART, C3TDZEWK     Type & Screen (If Applicable):  No results found for: LABABO, LABRH    Drug/Infectious Status (If Applicable):  No results found for: HIV, HEPCAB    COVID-19 Screening (If Applicable):   Lab Results   Component Value Date    COVID19 Not Detected 02/08/2021         Anesthesia Evaluation  Patient summary reviewed no history of anesthetic complications:   Airway: Mallampati: I  TM distance: >3 FB   Neck ROM: full  Mouth opening: > = 3 FB Dental: normal exam         Pulmonary:Negative Pulmonary ROS breath sounds clear to auscultation                             Cardiovascular:  Exercise tolerance: good (>4 METS),           Rhythm: regular  Rate: normal           Beta Blocker:  Not on Beta Blocker         Neuro/Psych:   Negative Neuro/Psych ROS              GI/Hepatic/Renal:   (+) GERD:,          ROS comment: PI.   Endo/Other: Negative Endo/Other ROS             Pt had no PAT visit       Abdominal:           Vascular: negative vascular ROS. Anesthesia Plan      MAC     ASA 1       Induction: intravenous. Anesthetic plan and risks discussed with patient and spouse. Plan discussed with CRNA.                 Jam Harris MD   2/12/2021

## 2021-02-23 ENCOUNTER — OFFICE VISIT (OUTPATIENT)
Dept: SURGERY | Age: 46
End: 2021-02-23
Payer: COMMERCIAL

## 2021-02-23 VITALS
RESPIRATION RATE: 18 BRPM | TEMPERATURE: 97.8 F | BODY MASS INDEX: 28.61 KG/M2 | HEIGHT: 66 IN | HEART RATE: 84 BPM | DIASTOLIC BLOOD PRESSURE: 80 MMHG | WEIGHT: 178 LBS | SYSTOLIC BLOOD PRESSURE: 116 MMHG

## 2021-02-23 DIAGNOSIS — K86.2 PANCREATIC CYST: ICD-10-CM

## 2021-02-23 DIAGNOSIS — D49.0 IPMN (INTRADUCTAL PAPILLARY MUCINOUS NEOPLASM): Primary | ICD-10-CM

## 2021-02-23 PROCEDURE — 99213 OFFICE O/P EST LOW 20 MIN: CPT | Performed by: TRANSPLANT SURGERY

## 2021-02-23 NOTE — PROGRESS NOTES
Hepatobiliary and Pancreatic Surgery Progress Note    CC: Pancreatic body cyst    Subjective: Patient states that she is doing well after her EUS with Dr. Rell Peterson. She denies any post operative issues. OBJECTIVE      Physical    /80 (Site: Left Upper Arm, Position: Sitting, Cuff Size: Medium Adult)   Pulse 84   Temp 97.8 °F (36.6 °C) (Temporal)   Resp 18   Ht 5' 6\" (1.676 m)   Wt 178 lb (80.7 kg)   BMI 28.73 kg/m²       General appearance: appears in no acute distress  Lungs:respiratory effort normal without accessory numbers  Heart: no pedal edema  Abdomen: soft, nondistended, nontympanic, no guarding, no peritoneal signs, normoactive bowel sounds  Extremities: ROM normal    ASSESSMENT: 2cm pancreatic body branch duct IPMN     PLAN:    - we discussed that she has benign features  - we discussed what worrisome features are   - we discussed that she falls into yearly MRI's  - I will see her back next year    20 Minutes of which greater than 50% was spent counseling or coordinating her care. Thank you for the consultation and allowing me to take part in Ms. Walls's care.     Please send a copy of my note and Dr. Aden Guevara operative note to Dr. Gita Chilel 2/23/2021 3:26 PM
SCM

## 2021-07-23 ENCOUNTER — HOSPITAL ENCOUNTER (OUTPATIENT)
Dept: GENERAL RADIOLOGY | Age: 46
Discharge: HOME | End: 2021-07-25
Payer: COMMERCIAL

## 2021-07-23 DIAGNOSIS — Z12.31 VISIT FOR SCREENING MAMMOGRAM: ICD-10-CM

## 2021-07-23 PROCEDURE — 77063 BREAST TOMOSYNTHESIS BI: CPT

## 2021-11-05 ENCOUNTER — HOSPITAL ENCOUNTER (OUTPATIENT)
Dept: GENERAL RADIOLOGY | Age: 46
End: 2021-11-05
Payer: COMMERCIAL

## 2021-11-05 ENCOUNTER — HOSPITAL ENCOUNTER (OUTPATIENT)
Dept: GENERAL RADIOLOGY | Age: 46
Discharge: HOME | End: 2021-11-07
Payer: COMMERCIAL

## 2021-11-05 DIAGNOSIS — N63.0 LUMP OR MASS IN BREAST: ICD-10-CM

## 2021-11-05 PROCEDURE — 76642 ULTRASOUND BREAST LIMITED: CPT

## 2022-01-19 ENCOUNTER — TELEPHONE (OUTPATIENT)
Dept: HEMATOLOGY | Age: 47
End: 2022-01-19

## 2022-01-19 NOTE — TELEPHONE ENCOUNTER
Spoke to patient who confirmed her appt dates and times for scheduled MRI and f/u with Dr. Dieter Donahue. Pt expressed understanding of instructions to fast prior to MRI.

## 2022-01-19 NOTE — TELEPHONE ENCOUNTER
LM for patient to return call for information on scheduled MRI    1 yr MRI  [facility] 2/4/22  A 12  S 1230  NPO 6-8    No labs needed.      F/u appt with Dr. Malgorzata Aldridge   2/8/22 1:45pm

## 2022-02-04 ENCOUNTER — HOSPITAL ENCOUNTER (OUTPATIENT)
Dept: MRI IMAGING | Age: 47
Discharge: HOME | End: 2022-02-06
Payer: COMMERCIAL

## 2022-02-04 DIAGNOSIS — D49.0 IPMN (INTRADUCTAL PAPILLARY MUCINOUS NEOPLASM): ICD-10-CM

## 2022-02-04 PROCEDURE — 6360000004 HC RX CONTRAST MEDICATION: Performed by: RADIOLOGY

## 2022-02-04 PROCEDURE — A9579 GAD-BASE MR CONTRAST NOS,1ML: HCPCS | Performed by: RADIOLOGY

## 2022-02-04 PROCEDURE — 74183 MRI ABD W/O CNTR FLWD CNTR: CPT

## 2022-02-04 RX ADMIN — GADOTERIDOL 16 ML: 279.3 INJECTION, SOLUTION INTRAVENOUS at 12:37

## 2022-02-09 ENCOUNTER — APPOINTMENT (OUTPATIENT)
Dept: CT IMAGING | Age: 47
End: 2022-02-09
Payer: COMMERCIAL

## 2022-02-09 ENCOUNTER — HOSPITAL ENCOUNTER (EMERGENCY)
Age: 47
Discharge: HOME | End: 2022-02-09
Payer: COMMERCIAL

## 2022-02-09 VITALS
BODY MASS INDEX: 26.2 KG/M2 | HEIGHT: 66 IN | WEIGHT: 163 LBS | OXYGEN SATURATION: 98 % | SYSTOLIC BLOOD PRESSURE: 123 MMHG | DIASTOLIC BLOOD PRESSURE: 85 MMHG | TEMPERATURE: 98.6 F | RESPIRATION RATE: 16 BRPM | HEART RATE: 100 BPM

## 2022-02-09 DIAGNOSIS — G44.309 POST-TRAUMATIC HEADACHE, NOT INTRACTABLE, UNSPECIFIED CHRONICITY PATTERN: Primary | ICD-10-CM

## 2022-02-09 DIAGNOSIS — G44.309 POST-CONCUSSION HEADACHE: ICD-10-CM

## 2022-02-09 DIAGNOSIS — R11.0 NAUSEA: ICD-10-CM

## 2022-02-09 PROCEDURE — 6370000000 HC RX 637 (ALT 250 FOR IP): Performed by: NURSE PRACTITIONER

## 2022-02-09 PROCEDURE — 70450 CT HEAD/BRAIN W/O DYE: CPT

## 2022-02-09 PROCEDURE — 99283 EMERGENCY DEPT VISIT LOW MDM: CPT

## 2022-02-09 RX ORDER — ACETAMINOPHEN 500 MG
500 TABLET ORAL 4 TIMES DAILY PRN
Qty: 20 TABLET | Refills: 1 | Status: SHIPPED | OUTPATIENT
Start: 2022-02-09

## 2022-02-09 RX ORDER — ONDANSETRON 4 MG/1
4 TABLET, ORALLY DISINTEGRATING ORAL ONCE
Status: COMPLETED | OUTPATIENT
Start: 2022-02-09 | End: 2022-02-09

## 2022-02-09 RX ORDER — ONDANSETRON 4 MG/1
4 TABLET, ORALLY DISINTEGRATING ORAL EVERY 8 HOURS PRN
Qty: 10 TABLET | Refills: 0 | Status: SHIPPED | OUTPATIENT
Start: 2022-02-09

## 2022-02-09 RX ORDER — ACETAMINOPHEN 500 MG
1000 TABLET ORAL ONCE
Status: COMPLETED | OUTPATIENT
Start: 2022-02-09 | End: 2022-02-09

## 2022-02-09 RX ADMIN — ACETAMINOPHEN 1000 MG: 500 TABLET ORAL at 14:23

## 2022-02-09 RX ADMIN — ONDANSETRON 4 MG: 4 TABLET, ORALLY DISINTEGRATING ORAL at 14:23

## 2022-02-09 ASSESSMENT — PAIN SCALES - GENERAL: PAINLEVEL_OUTOF10: 8

## 2022-02-09 NOTE — Clinical Note
Chana Nieto was seen and treated in our emergency department on 2/9/2022. She may return to work on 02/12/2022. If you have any questions or concerns, please don't hesitate to call.       Omid Gaston, APRN - CNP

## 2022-02-09 NOTE — Clinical Note
Chana Nieto was seen and treated in our emergency department on 2/9/2022. She may return to work on 02/13/2022. If you have any questions or concerns, please don't hesitate to call.       Omid Gaston, APRN - CNP

## 2022-02-09 NOTE — Clinical Note
Juanis Wayne was seen and treated in our emergency department on 2/9/2022. She may return to work on 02/13/2022. If you have any questions or concerns, please don't hesitate to call.       Soha Kent, APRN - CNP

## 2022-02-09 NOTE — ED PROVIDER NOTES
[address] [place] [address]  Department of Emergency Medicine   ED  Encounter Note  Admit Date/RoomTime: 2022  2:13 PM  ED Room: [place] [address]    NAME: Alli Plunkett  : 1975  MRN: [id number]     Chief Complaint:  Headache (x 2 hours, reports hitting head 1 week ago. C/O nausea)    History of Present Illness         Alli Plunkett is a 48 y.o. old female who presents to the emergency department by private vehicle alone, for head injury which occured 1 week(s) prior to arrival. The complaint is due to hitting the right forehead into a open cupboard while at home and states the pain is 7/10 and . Loss of consciousness did not occur. The injury has been associated with headache and nausea and denies any confusion, diaphoresis, fever, nasal congestion, abdominal pain, neck pain, back pain, chest pain, palpitations, vertigo, dizziness, blurred vision, diplopia, loss of vision, slurred speech, focal weakness, focal sensory loss, clumsiness, vomiting, incontinence or seizure activity. Previous head injury: no.  Since onset the symptoms have been moderate in degree. Her pain is aggraveated by nothing and relieved by nothing and did take acetaminophen early this AM and seemed to help a little. She takes no blood thinning agents. 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. She denie  The patients tetanus status is unknown. ROS   Pertinent positives and negatives are stated within HPI, all other systems reviewed and are negative. Past Medical History:  has a past medical history of GERD (gastroesophageal reflux disease). Surgical History:  has a past surgical history that includes Upper gastrointestinal endoscopy; Gastric fundoplication; Cholecystectomy; Inguinal hernia repair (Left,  approx); Knee arthroscopy (Right, approx ); Upper gastrointestinal endoscopy (N/A, 2021); and Upper gastrointestinal endoscopy (N/A, 2021).     Social History:  reports that she quit smoking about 7 years ago. Her smoking use included cigarettes. She has never used smokeless tobacco. She reports current alcohol use of about 3.0 - 4.0 standard drinks of alcohol per week. She reports that she does not use drugs. Family History: family history includes Prostate Cancer in her maternal grandfather. Allergies: Zantac [ranitidine]    Physical Exam   Oxygen Saturation Interpretation: Normal.        ED Triage Vitals   BP Temp Temp Source Pulse Resp SpO2 Height Weight   02/09/22 1410 02/09/22 1336 02/09/22 1336 02/09/22 1336 02/09/22 1336 02/09/22 1336 02/09/22 1410 02/09/22 1410   123/85 98.6 °F (37 °C) Oral 116 16 98 % 5' 6\" (1.676 m) 163 lb (73.9 kg)         Constitutional: Level of Consciousness: Awake and alert, cooperative. ETOH: No.               Distress: none. Head:  Traumatic:  no.                  Scalp Tenderness:  Right frontal.                  Deformity: no.               Skin:  normal.  Eyes:  PERRL, EOMI. No periorbital ecchymoses. Conjunctiva: normal.  No raccoons or orellana sign noted. No hyphema's. Ears:  External ears without lesions. No hemotympanums. Throat:  Airway patient. Neck:  Supple. No midline bony tenderness to firm palpation, full ROM. Chest:  Symmetrical without visible rash or tenderness. Respiratory:  Clear to auscultation and breath sounds equal.  CV:  Regular rate and rhythm, normal heart sounds, without pathological murmurs. GI:  Abdomen Soft, nontender. No abrasions, ecchymoses, or lacerations. Back:  No costovertebral, paravertebral, intervertebral, or vertebral tenderness or spasm. Integument:  No abrasions, ecchymoses, or lacerations unless noted elsewhere. Extremities  No tenderness or swelling. Normal, painless range of motion. No neurovascular deficit. Neurological:  Oriented x 3,  GCS15. Motor functions intact. Cranial nerves II through XII grossly intact.             NEXUS Criteria For C-Spine Imaging:     []   Focal Neurologic Deficit Present? []   Midline Spinal Tenderness Present? []   Altered Level of Consciousness Present? []   Intoxication Present? []   Distracting Injury Present? Lab / Imaging Results   (All laboratory and radiology results have been personally reviewed by myself)  Labs:  No results found for this visit on 02/09/22. Imaging: All Radiology results interpreted by Radiologist unless otherwise noted. CT HEAD WO CONTRAST   Final Result   No acute intracranial abnormality. Specifically, there is no acute   intracranial hemorrhage. ED Course / Medical Decision Making     Medications   acetaminophen (TYLENOL) tablet 1,000 mg (1,000 mg Oral Given 2/9/22 1423)   ondansetron (ZOFRAN-ODT) disintegrating tablet 4 mg (4 mg Oral Given 2/9/22 1423)        Re-examination:  2/9/22       Time: 1616 discussed results with patient and her sister-in-law with her permission. Patient reports that the headache is resolving but is still present and advised on postconcussive syndrome. Nausea has completely resolved. Consult(s):   None    Procedure(s):  none    MDM:   This is a 53-year-old female patient who arrives today with complaints of headache and nausea and sustained a injury to the right forehead region approximately 1 week ago and did not have any loss of consciousness. She denies any weaknesses, blurred vision, double vision she does complain of nausea with no emesis and no abdominal pain. She has no cervical pain and Nexus criteria negative for imaging. Patient has no neurologic or sensory deficit on examination. CT of the head performed and no acute intracranial hemorrhage noted. Patient was medicated with Tylenol and Zofran and the nausea has completely resolved the headache is still present but it is resolving. Patient advised on postconcussive syndrome and signs and symptoms specific for immediate return to the ED for reevaluation at any time.   Patient also advised to follow-up with her PCP for reevaluation. Patient will be given a short course of antiemetic and Tylenol for discharge. Work excuse provided. Patient was tachycardic on admission and most likely from the nausea she denies any chest pain or shortness of breath and heart rate improved after being medicated for the nausea. Plan of Care/Counseling:  LENORE Shoemaker CNP reviewed today's visit with the patient in addition to providing specific details for the plan of care and counseling regarding the diagnosis and prognosis. Questions are answered at this time and are agreeable with the plan. Assessment      1. Post-traumatic headache, not intractable, unspecified chronicity pattern    2. Nausea    3. Post-concussion headache      Plan   Discharged home. Patient condition is good    New Medications     Discharge Medication List as of 2/9/2022  4:20 PM      START taking these medications    Details   ondansetron (ZOFRAN ODT) 4 MG disintegrating tablet Take 1 tablet by mouth every 8 hours as needed for Nausea or Vomiting, Disp-10 tablet, R-0Normal      acetaminophen (TYLENOL) 500 MG tablet Take 1 tablet by mouth 4 times daily as needed for Pain, Disp-20 tablet, R-1Normal           Electronically signed by LENORE Shoemaker CNP   DD: 2/9/22  **This report was transcribed using voice recognition software. Every effort was made to ensure accuracy; however, inadvertent computerized transcription errors may be present.   END OF ED PROVIDER NOTE             LENORE Shoemaker CNP  02/09/22 0715

## 2022-02-22 ENCOUNTER — OFFICE VISIT (OUTPATIENT)
Dept: SURGERY | Age: 47
End: 2022-02-22
Payer: COMMERCIAL

## 2022-02-22 VITALS
HEART RATE: 89 BPM | TEMPERATURE: 97.8 F | SYSTOLIC BLOOD PRESSURE: 114 MMHG | DIASTOLIC BLOOD PRESSURE: 76 MMHG | BODY MASS INDEX: 27 KG/M2 | HEIGHT: 66 IN | WEIGHT: 168 LBS | RESPIRATION RATE: 18 BRPM

## 2022-02-22 DIAGNOSIS — D27.9 MUCINOUS CYSTADENOMA: Primary | ICD-10-CM

## 2022-02-22 PROCEDURE — 99213 OFFICE O/P EST LOW 20 MIN: CPT | Performed by: TRANSPLANT SURGERY

## 2022-02-22 NOTE — PROGRESS NOTES
Hepatobiliary and Pancreatic Surgery Progress Note    CC: Pancreatic body cyst    Subjective: Patient states that she still has some epigastric abdominal pain. She states that she has minimal bloating. She denies any diarrhea. She does have an intact fundoplication. She had an EUS with Dr. Peggy Walton in early 2020 which showed a BD-IPMN with benign features. 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 - we discussed the MRI results. Of note her cyst is smaller  - will ask for an EUS with Dr. Aby Flores and fluid analysis if he feels its indicated  - we discussed that she will need close follow up. 20 Minutes of which greater than 50% was spent counseling or coordinating her care. Thank you for the consultation and allowing me to take part in Ms. Walls's care.     Please send a copy of my note to Dr. Karma Markham    Electronically signed by Mathew Wang MD on 2/22/2022 at 3:19 PM

## 2022-04-05 ENCOUNTER — TELEPHONE (OUTPATIENT)
Dept: GASTROENTEROLOGY | Age: 47
End: 2022-04-05

## 2022-04-05 NOTE — TELEPHONE ENCOUNTER
Left detailed message for patient cancelling appointment due to Dr. Ledy Delgadillo being out of the office. Informed the patient that someone from the office will call them to reschedule once we know when Dr. Ledy Delgadillo will return.        Electronically signed by Shannon Zaragoza MA on 4/5/2022 at 1:57 PM

## 2022-05-10 ENCOUNTER — OFFICE VISIT (OUTPATIENT)
Dept: GASTROENTEROLOGY | Age: 47
End: 2022-05-10
Payer: COMMERCIAL

## 2022-05-10 VITALS
DIASTOLIC BLOOD PRESSURE: 87 MMHG | HEART RATE: 87 BPM | BODY MASS INDEX: 26.39 KG/M2 | OXYGEN SATURATION: 98 % | WEIGHT: 164.2 LBS | SYSTOLIC BLOOD PRESSURE: 119 MMHG | HEIGHT: 66 IN | RESPIRATION RATE: 16 BRPM | TEMPERATURE: 97.1 F

## 2022-05-10 DIAGNOSIS — K86.2 PANCREATIC CYST: Primary | ICD-10-CM

## 2022-05-10 PROCEDURE — 99202 OFFICE O/P NEW SF 15 MIN: CPT | Performed by: NURSE PRACTITIONER

## 2022-05-10 NOTE — PROGRESS NOTES
Mary Mendoza (:  1975) is a 50 y.o. female, here for evaluation of the following chief complaint(s):  New Patient (Referred by Dr Romana Dancer for EUS)      SUBJECTIVE/OBJECTIVE:  HPI:    Jenny Santana is a very pleasant 50year old female that presents today at the request of Dr. Romana Dancer for EUS. MRI 22 showed   9 x 13 mm complex cystic lesion in the body/tail of the pancreas with   enhancing septi but no nodular enhancement.  No evidence of main duct or side   branch duct pancreatic ductal dilatation.  Findings suggesting mucinous   pancreatic cystic neoplasm such as a mucinous cystadenoma or mucinous   cystadenocarcinoma. Patient has had EUS in the past. Recent MRI  Noted a change. Dr. Romana Dancer recommended an EUS/ERCP. Patient has known about the pancreatic cyst since . Patient offers no complaints today. ROS:  General: Patient denies n/v/f/c or weight loss. HEENT: Patient denies persistent postnasal drip, scleral icterus, drooling, persistent bleeding from nose/mouth. Resp: Patient denies SOB, wheezing, productive cough. Cards: Patient denies CP, palpitations, significant edema  GI: As above. Derm: Patient denies jaundice/rashes. Musc: Patient denies diffuse/irregular joint swelling or myalgias. Objective   Wt Readings from Last 3 Encounters:   05/10/22 164 lb 3.2 oz (74.5 kg)   22 168 lb (76.2 kg)   22 163 lb (73.9 kg)     Temp Readings from Last 3 Encounters:   05/10/22 97.1 °F (36.2 °C) (Temporal)   22 97.8 °F (36.6 °C) (Temporal)   22 98.6 °F (37 °C) (Oral)     BP Readings from Last 3 Encounters:   05/10/22 119/87   22 114/76   22 123/85     Pulse Readings from Last 3 Encounters:   05/10/22 87   22 89   22 100        Physical Exam  Cardiovascular:      Heart sounds: Normal heart sounds. Pulmonary:      Breath sounds: Normal breath sounds.          Past Medical History:   Diagnosis Date    GERD (gastroesophageal reflux disease)     Pancreatic cyst       Past Surgical History:   Procedure Laterality Date    CHOLECYSTECTOMY      GASTRIC FUNDOPLICATION      INGUINAL HERNIA REPAIR Left 1978 approx    KNEE ARTHROSCOPY Right approx 1991    UPPER GASTROINTESTINAL ENDOSCOPY      UPPER GASTROINTESTINAL ENDOSCOPY N/A 2/12/2021    ENDOSCOPIC ULTRASOUND performed by Marta Quinones DO at [facility] ENDOSCOPY N/A 2/12/2021    EGD performed by Marta Quinones DO at [facility] ENDOSCOPY      Family History   Problem Relation Age of Onset    Heart Disease Mother     Prostate Cancer Maternal Grandfather     Irritable Bowel Syndrome Child         Lab Results   Component Value Date    WBC 7.3 12/24/2020    HGB 13.8 12/24/2020    HCT 40.5 12/24/2020    MCV 97.1 12/24/2020     12/24/2020      Lab Results   Component Value Date     12/24/2020    K 3.8 12/24/2020     12/24/2020    CO2 26 12/24/2020    BUN 11 12/24/2020    CREATININE 0.9 12/24/2020    GLUCOSE 99 12/24/2020    CALCIUM 9.2 12/24/2020    PROT 7.3 12/24/2020    LABALBU 4.5 12/24/2020    BILITOT 0.2 12/24/2020    ALKPHOS 81 12/24/2020    AST 15 12/24/2020    ALT 6 12/24/2020    LABGLOM >60 12/24/2020    GFRAA >60 12/24/2020                       ASSESSMENT/PLAN:    1. Pancreatic cyst    -Will proceed with EUS at the request of Dr. Michelle Burgess. Return for Follow up with Dr. Diane Vicente post procedure. An electronic signature was used to authenticate this note.     --Aaron Richey, APRN - CNP

## 2022-05-12 ENCOUNTER — TELEPHONE (OUTPATIENT)
Dept: GASTROENTEROLOGY | Age: 47
End: 2022-05-12

## 2022-05-12 NOTE — TELEPHONE ENCOUNTER
Prior Authorization Form:      DEMOGRAPHICS:                     Patient Name:  Kareen Haque  Patient :  1975            Insurance:  Payor: Sherri [place] / Plan: Sherri [place] PPO / Product Type: *No Product type* /   Insurance ID Number:    Payor/Plan Subscr  Sex Relation Sub. Ins. ID Effective Group Num   1.  [id number] A 1963 Male Spouse [id number]                                    [id number]         DIAGNOSIS & PROCEDURE:                       Procedure/Operation: EUS           CPT Code: 72364    Diagnosis:  Pancreas cyst    ICD10 Code: K86.2    Location:  [facility]    Surgeon:  Dr. Arden Elliott     SCHEDULING INFORMATION:                          Date: 2022    Time: 1300              Anesthesia:  MAC/TIVA                                                       Status:  Outpatient          Electronically signed by Ofelia Gaxiola MA on 2022 at 1:00 PM

## 2022-05-25 NOTE — PROGRESS NOTES
[address] PRE-ADMISSION TESTING   ENDOSCOPY/ COLONSCOPY INSTRUCTIONS  PAT- Phone Number: [phone]    ENDOSCOPY/ COLONSCOPY INSTRUCTIONS:     [] Bowel Prep instructions reviewed. [] Colonoscopy- The day prior: No solid foods. Clear liquids only. [x] Nothing by mouth after midnight. Including no gum, candy, mints, or water. [x] You may brush your teeth, gargle, but do NOT swallow water. [x] Do not wear makeup, lotions, powders, deodorant. [x] Arrange transportation with a responsible adult  to and from the hospital. If you do not have a responsible adult  to transport you, you will need to make arrangements with a medical transportation company. Arrange for someone to be with you for the remainder of the day and for 24 hours after your procedure due to having had anesthesia. -Who will be your  for transportation? - Ladoris Acron  -Who will be staying with you for 24 hrs after your procedure? - Ladoris Acron    PARKING INSTRUCTIONS:     [x] ARRIVAL TIME: 6/1/22 @ 1300  · [x] Enter into the [employer]. Two people may accompany you. Masks are required. · [x] Parking Lot \"I\" is where you will park. It is located on the corner of [address] and [address]. The entrance is on [address]. · To enter, press the button and the gate will lift. A free token will be provided to exit the lot. EDUCATION INSTRUCTIONS:    [x] Bring a complete list of your medications, please write the last time you took the medicine, give this list to the nurse. [x] Take the following medications the morning of surgery with 1-2 ounces of water: None  [x] Stop herbal supplements and vitamins 5 days before your surgery. [x] Follow physician instructions regarding any blood thinners you may be taking.     WHAT TO EXPECT:    [x] The day of your procedure you will be greeted and checked in by the [employer].  In addition, you will be registered in the [facility] by a Patient Access Representative. Please bring your photo ID and insurance card. A nurse will greet you in accordance to the time you are needed in the pre-op area to prepare you for surgery. Please do not be discouraged if you are not greeted in the order you arrive as there are many variables that are involved in patient preparation. Your patience is greatly appreciated as you wait for your nurse. Please bring in items such as: books, magazines, newspapers, electronics, or any other items  to occupy your time in the waiting area. [x]  Delays may occur. Staff will make a sincere effort to keep you informed of delays. If any delays occur with your procedure, we apologize ahead of time for your inconvenience as we recognize the value of your time.

## 2022-05-26 ENCOUNTER — TELEPHONE (OUTPATIENT)
Dept: HEMATOLOGY | Age: 47
End: 2022-05-26

## 2022-05-26 NOTE — TELEPHONE ENCOUNTER
I called patient to make her a EUS follow up with Dr. Araceli Sethi. She started a new job and works until 3:30pm and is an hour away so she requested a phone call with Dr. Araceli Sethi to discuss her EUS results. I made her a phone call appt on 6/9/22 at 3:30pm and I did explain to her that he will call once he is done in clinic at it will probably be closer to 4:00pm.  She verbalized understanding and confirmed this phone call.     Electronically signed by Mandi Grossman RN on 5/26/2022 at 10:32 AM

## 2022-06-01 ENCOUNTER — ANESTHESIA EVENT (OUTPATIENT)
Dept: ENDOSCOPY | Age: 47
End: 2022-06-01
Payer: COMMERCIAL

## 2022-06-01 ENCOUNTER — HOSPITAL ENCOUNTER (OUTPATIENT)
Age: 47
Setting detail: OUTPATIENT SURGERY
Discharge: HOME | End: 2022-06-01
Attending: STUDENT IN AN ORGANIZED HEALTH CARE EDUCATION/TRAINING PROGRAM | Admitting: STUDENT IN AN ORGANIZED HEALTH CARE EDUCATION/TRAINING PROGRAM
Payer: COMMERCIAL

## 2022-06-01 ENCOUNTER — ANESTHESIA (OUTPATIENT)
Dept: ENDOSCOPY | Age: 47
End: 2022-06-01
Payer: COMMERCIAL

## 2022-06-01 VITALS
BODY MASS INDEX: 26.2 KG/M2 | HEIGHT: 66 IN | SYSTOLIC BLOOD PRESSURE: 116 MMHG | TEMPERATURE: 97.9 F | RESPIRATION RATE: 16 BRPM | OXYGEN SATURATION: 97 % | DIASTOLIC BLOOD PRESSURE: 72 MMHG | WEIGHT: 163 LBS | HEART RATE: 72 BPM

## 2022-06-01 DIAGNOSIS — Z01.812 PRE-OPERATIVE LABORATORY EXAMINATION: Primary | ICD-10-CM

## 2022-06-01 LAB
HCG, URINE, POC: NEGATIVE
Lab: NORMAL
NEGATIVE QC PASS/FAIL: NORMAL
POSITIVE QC PASS/FAIL: NORMAL

## 2022-06-01 PROCEDURE — 3700000001 HC ADD 15 MINUTES (ANESTHESIA): Performed by: STUDENT IN AN ORGANIZED HEALTH CARE EDUCATION/TRAINING PROGRAM

## 2022-06-01 PROCEDURE — 3700000000 HC ANESTHESIA ATTENDED CARE: Performed by: STUDENT IN AN ORGANIZED HEALTH CARE EDUCATION/TRAINING PROGRAM

## 2022-06-01 PROCEDURE — 7100000011 HC PHASE II RECOVERY - ADDTL 15 MIN: Performed by: STUDENT IN AN ORGANIZED HEALTH CARE EDUCATION/TRAINING PROGRAM

## 2022-06-01 PROCEDURE — 3609020800 HC EGD W/EUS FNA: Performed by: STUDENT IN AN ORGANIZED HEALTH CARE EDUCATION/TRAINING PROGRAM

## 2022-06-01 PROCEDURE — 2580000003 HC RX 258

## 2022-06-01 PROCEDURE — 88173 CYTOPATH EVAL FNA REPORT: CPT

## 2022-06-01 PROCEDURE — 43242 EGD US FINE NEEDLE BX/ASPIR: CPT | Performed by: STUDENT IN AN ORGANIZED HEALTH CARE EDUCATION/TRAINING PROGRAM

## 2022-06-01 PROCEDURE — C1753 CATH, INTRAVAS ULTRASOUND: HCPCS | Performed by: STUDENT IN AN ORGANIZED HEALTH CARE EDUCATION/TRAINING PROGRAM

## 2022-06-01 PROCEDURE — 3609018500 HC EGD US SCOPE W/ADJACENT STRUCTURES: Performed by: STUDENT IN AN ORGANIZED HEALTH CARE EDUCATION/TRAINING PROGRAM

## 2022-06-01 PROCEDURE — 7100000010 HC PHASE II RECOVERY - FIRST 15 MIN: Performed by: STUDENT IN AN ORGANIZED HEALTH CARE EDUCATION/TRAINING PROGRAM

## 2022-06-01 PROCEDURE — 2709999900 HC NON-CHARGEABLE SUPPLY: Performed by: STUDENT IN AN ORGANIZED HEALTH CARE EDUCATION/TRAINING PROGRAM

## 2022-06-01 PROCEDURE — 6360000002 HC RX W HCPCS

## 2022-06-01 PROCEDURE — 88305 TISSUE EXAM BY PATHOLOGIST: CPT

## 2022-06-01 RX ORDER — ONDANSETRON 2 MG/ML
4 INJECTION INTRAMUSCULAR; INTRAVENOUS EVERY 6 HOURS PRN
Status: CANCELLED | OUTPATIENT
Start: 2022-06-01

## 2022-06-01 RX ORDER — SODIUM CHLORIDE 9 MG/ML
INJECTION, SOLUTION INTRAVENOUS CONTINUOUS PRN
Status: DISCONTINUED | OUTPATIENT
Start: 2022-06-01 | End: 2022-06-01 | Stop reason: SDUPTHER

## 2022-06-01 RX ORDER — PROPOFOL 10 MG/ML
INJECTION, EMULSION INTRAVENOUS CONTINUOUS PRN
Status: DISCONTINUED | OUTPATIENT
Start: 2022-06-01 | End: 2022-06-01 | Stop reason: SDUPTHER

## 2022-06-01 RX ORDER — CIPROFLOXACIN 2 MG/ML
INJECTION, SOLUTION INTRAVENOUS PRN
Status: DISCONTINUED | OUTPATIENT
Start: 2022-06-01 | End: 2022-06-01 | Stop reason: SDUPTHER

## 2022-06-01 RX ORDER — SODIUM CHLORIDE 0.9 % (FLUSH) 0.9 %
5-40 SYRINGE (ML) INJECTION EVERY 12 HOURS SCHEDULED
Status: DISCONTINUED | OUTPATIENT
Start: 2022-06-01 | End: 2022-06-01 | Stop reason: HOSPADM

## 2022-06-01 RX ORDER — SODIUM CHLORIDE 0.9 % (FLUSH) 0.9 %
5-40 SYRINGE (ML) INJECTION PRN
Status: CANCELLED | OUTPATIENT
Start: 2022-06-01

## 2022-06-01 RX ORDER — CIPROFLOXACIN 500 MG/1
500 TABLET, FILM COATED ORAL 2 TIMES DAILY
Qty: 5 TABLET | Refills: 0 | Status: SHIPPED | OUTPATIENT
Start: 2022-06-01 | End: 2022-06-04

## 2022-06-01 RX ORDER — SODIUM CHLORIDE 9 MG/ML
INJECTION, SOLUTION INTRAVENOUS PRN
Status: CANCELLED | OUTPATIENT
Start: 2022-06-01

## 2022-06-01 RX ORDER — SODIUM CHLORIDE 0.9 % (FLUSH) 0.9 %
5-40 SYRINGE (ML) INJECTION PRN
Status: DISCONTINUED | OUTPATIENT
Start: 2022-06-01 | End: 2022-06-01 | Stop reason: HOSPADM

## 2022-06-01 RX ORDER — PROPOFOL 10 MG/ML
INJECTION, EMULSION INTRAVENOUS PRN
Status: DISCONTINUED | OUTPATIENT
Start: 2022-06-01 | End: 2022-06-01 | Stop reason: SDUPTHER

## 2022-06-01 RX ORDER — SODIUM CHLORIDE 9 MG/ML
25 INJECTION, SOLUTION INTRAVENOUS PRN
Status: DISCONTINUED | OUTPATIENT
Start: 2022-06-01 | End: 2022-06-01 | Stop reason: HOSPADM

## 2022-06-01 RX ORDER — SODIUM CHLORIDE 0.9 % (FLUSH) 0.9 %
5-40 SYRINGE (ML) INJECTION EVERY 12 HOURS SCHEDULED
Status: CANCELLED | OUTPATIENT
Start: 2022-06-01

## 2022-06-01 RX ORDER — CIPROFLOXACIN 2 MG/ML
400 INJECTION, SOLUTION INTRAVENOUS
Status: DISCONTINUED | OUTPATIENT
Start: 2022-06-01 | End: 2022-06-01 | Stop reason: HOSPADM

## 2022-06-01 RX ORDER — ONDANSETRON 4 MG/1
4 TABLET, ORALLY DISINTEGRATING ORAL EVERY 8 HOURS PRN
Status: CANCELLED | OUTPATIENT
Start: 2022-06-01

## 2022-06-01 RX ADMIN — PROPOFOL 40 MG: 10 INJECTION, EMULSION INTRAVENOUS at 15:42

## 2022-06-01 RX ADMIN — PROPOFOL 100 MG: 10 INJECTION, EMULSION INTRAVENOUS at 15:15

## 2022-06-01 RX ADMIN — SODIUM CHLORIDE: 9 INJECTION, SOLUTION INTRAVENOUS at 15:08

## 2022-06-01 RX ADMIN — CIPROFLOXACIN 400 MG: 2 INJECTION, SOLUTION INTRAVENOUS at 15:20

## 2022-06-01 RX ADMIN — PROPOFOL 125 MCG/KG/MIN: 10 INJECTION, EMULSION INTRAVENOUS at 15:15

## 2022-06-01 ASSESSMENT — PAIN - FUNCTIONAL ASSESSMENT: PAIN_FUNCTIONAL_ASSESSMENT: NONE - DENIES PAIN

## 2022-06-01 NOTE — OP NOTE
Operative Note      Patient: Chinmay Blair  YOB: 1975  MRN: [id number]    Date of Procedure: 6/1/2022    Pre-Op Diagnosis: PANCREAS CYST    Post-Op Diagnosis: Same       Procedure(s):  EGD ESOPHAGOGASTRODUODENOSCOPY ULTRASOUND  EGD W/EUS FNA    Surgeon(s):  Andres Lindsay MD    Assistant:   * No surgical staff found *    Anesthesia: Monitor Anesthesia Care    Estimated Blood Loss (mL): Minimal    Complications: None    Specimens:   ID Type Source Tests Collected by Time Destination   A : PANCREATIC CYST FNA Body Fluid Fine Needle Aspirate CYTOLOGY, NON-GYN Andres Lindsay MD 6/1/2022 1530        Implants:  * No implants in log *      Drains: * No LDAs found *    Findings:     EGD:  Normal esophagus. Normal stomach. Normal duodenum. Normal ampulla. EUS:  A pancreatic cyst was appreciated in the body of the pancreas. The cyst was septated with many compartments. The septations were thick and one of them calcified. No other associated high risk features, mural nodules, or solid masses were appreciated. The pancreatic duct was normal in size throughout the entire examined pancreas. The common bile duct was normal in size. Detailed Description of Procedure:   Pre-Anesthesia Assessment:  Prior to the procedure, a history and physical was performed and patient medications and allergies were reviewed. The risks, benefits, complications, treatment options and expected outcomes were discussed with the patient. The possibilities of reaction to medication, pulmonary aspiration, perforation of the gastrointestinal tract, bleeding requiring transfusion or operation, respiratory failure requiring placement on a ventilator, failure to diagnose a condition, and acute pancreatitis with potential need for prolonged hospitalization were discussed with the patient. All questions were answered and informed consent was obtained.   Patient identification and proposed procedure were verified by the physician, the nurse, the anesthesiologist, the anesthetist, and the technician in the preprocedure area. Please see accompanying documentation. All staff in attendance for the performed procedure act in the role of the Chaperone. After I obtained informed consent, the scope was passed under direct vision. Throughout the procedure, the patient's blood pressure, pulse, and oxygen saturations were monitored continuously. The echoendoscope was introduced through the mouth and advanced to the duodenum. The procedure was performed without difficulty. The patient tolerated the procedure well. EGD:  The mucosa of the esophagus appeared normal.  No stricture, ulceration, or inflammation was appreciated. The Z-line was regular and found at 40cm. No hiatal hernia was appreciated. The mucosa of the stomach appeared normal.  No inflammation, erosion, or ulceration was appreciated in the body, antrum, or pylorus. The fundus and cardia were carefully inspected in retroflexion and showed normal mucosa. The mucosa of the duodenum appeared normal.  No stricturing, inflammation, erosion, or ulceration was appreciated. The ampulla was appreciated and appeared normal.     EUS:    The region of the celiac plexus and celiac ganglia was visualized and showed no sign of significant endosonographic abnormality. The vascular anatomy of the region was normal.    In the pancreatic body, a 1.1cm cystic lesion was appreciated. The cystic lesion was irregular in shape divided into multiple compartments with thick septations. One of the septations was noted to be calcified with associated shadowing. No clear ductal communication was present. No associated mural nodule was appreciated. There were no solid components to the cyst.     The surrounding parenchyma of the pancreatic body and tail was normal. The pancreatic duct in the body and tail of the pancreas was thin in caliber and regular in contour.      The head of the pancreas was normal. The pancreatic duct in the head of the pancreas was thin in caliber and regular in contour. The common bile duct was normal in contour and normal in size. No intraductal stones or debris were appreciated. The spleen was normal in endosonographic appearance. There was no sign of significant endosonographic abnormality in the examined portions of the liver. Homogenous parenchyma and no focal pathology were identified. The echosignal of the parenchyma was slightly dark, potentially consistent with fatty infiltration. Recommendations:  -The patient will be observed post procedure until all discharge criteria are met. -Patient has a contact number available for emergencies. The signs and symptoms of potential delayed complications were discussed with the patient.    -Return to normal activities tomorrow. -Written discharge instructions were provided to the patient.    -Await cytology results.  -Clinic appointment to be scheduled.      Electronically signed by Madiha Resendez MD on 6/1/2022 at 4:18 PM

## 2022-06-01 NOTE — H&P
History and Physical      ASSESSMENT AND PLAN:    47y/F w/ history of pancreatic cyst which appeared simple on EUS on Feb 2021 but now shows changes of enhancing septi on MRI/MRCP. PLAN:  EUS with possible FNA today for further assessment. HISTORY OF PRESENT ILLNESS:      Ms. Ana Negron is a 47y/F w/ known pancreatic cystic lesion who underwent EUS of this pancreatic cyst in February 2021 which showed a 19mm cyst in the pancreas body with no associated complicated. MRI/MRCP performed February 2022 showed a 13mm pancreas cyst w/ enhancing septi but otherwise no other high risk features. The patient presents for further endoscopic assessment of the cystic lesion. Past Medical History:        Diagnosis Date    GERD (gastroesophageal reflux disease)     Pancreatic cyst      Past Surgical History:        Procedure Laterality Date    CHOLECYSTECTOMY      GASTRIC FUNDOPLICATION      INGUINAL HERNIA REPAIR Left 1978 approx    KNEE ARTHROSCOPY Right approx 1991    UPPER GASTROINTESTINAL ENDOSCOPY      UPPER GASTROINTESTINAL ENDOSCOPY N/A 2/12/2021    ENDOSCOPIC ULTRASOUND performed by Almaz Turner DO at [address] [facility] N/A 2/12/2021    EGD performed by Almaz Turner DO at [facility] History:    TOBACCO:   reports that she quit smoking about 7 years ago. Her smoking use included cigarettes. She has a 25.00 pack-year smoking history. She has never used smokeless tobacco.  ETOH:   reports current alcohol use of about 3.0 - 4.0 standard drinks of alcohol per week. DRUGS:   reports no history of drug use. Family History:       Problem Relation Age of Onset    Heart Disease Mother     Prostate Cancer Maternal Grandfather     Irritable Bowel Syndrome Child        No current facility-administered medications for this encounter.     Current Outpatient Medications:     ondansetron (ZOFRAN ODT) 4 MG disintegrating tablet, Take 1 tablet by mouth every 8 hours as needed for Nausea or Vomiting, Disp: 10 tablet, Rfl: 0    acetaminophen (TYLENOL) 500 MG tablet, Take 1 tablet by mouth 4 times daily as needed for Pain, Disp: 20 tablet, Rfl: 1    omeprazole (PRILOSEC) 20 MG delayed release capsule, Take 20 mg by mouth nightly , Disp: , Rfl:     Multiple Vitamins-Minerals (THERAPEUTIC MULTIVITAMIN-MINERALS) tablet, Take 1 tablet by mouth daily, Disp: , Rfl:     NONFORMULARY, Take 1 capsule by mouth 2 times daily PROBIOTIC, Disp: , Rfl:      Allergies:  Zantac [ranitidine]    ROS:  General: Patient denies n/v/f/c or weight loss. HEENT: Patient denies persistent postnasal drip, scleral icterus, drooling, persistent bleeding from nose/mouth. Resp: Patient denies SOB, wheezing, productive cough. Cards: Patient denies CP, palpitations, significant edema  GI: As above. Derm: Patient denies jaundice/rashes. Musc: Patient denies diffuse/irregular joint swelling or myalgias. PHYSICAL EXAM:  Ht 5' 6\" (1.676 m)   Wt 163 lb (73.9 kg)   LMP 02/11/2022   BMI 26.31 kg/m²   Physical Exam:  General: Overall well-appearing, NAD  HEENT: PERRLA, EOMI, Anicteric sclera, MMM, no rhinorrhea  Cards: RRR, no LE edema  Resp: Breathing comfortably on room air, good air movement, no use of accessory muscles, no audible wheezing  Abdomen: soft, NT, ND  Extremities: Moves all extremities, no effusions or bruising.   Skin: No rashes or jaundice  Neuro: A&O x 3, CN grossly intact, non-focal exam            Electronically signed by Joaquín Marmolejo MD on 6/1/2022 at 3:48 AM

## 2022-06-01 NOTE — ANESTHESIA PRE PROCEDURE
Department of Anesthesiology  Preprocedure Note       Name:  Larry Jesus   Age:  50 y.o.  :  1975                                          MRN:  [id number]         Date:  2022      Surgeon: Walter Bradley):  Garrison Sparks MD    Procedure: Procedure(s):  EGD ESOPHAGOGASTRODUODENOSCOPY ULTRASOUND    Medications prior to admission:   Prior to Admission medications    Medication Sig Start Date End Date Taking? Authorizing Provider   ondansetron (ZOFRAN ODT) 4 MG disintegrating tablet Take 1 tablet by mouth every 8 hours as needed for Nausea or Vomiting 22   Sirisha Courser, APRN - CNP   acetaminophen (TYLENOL) 500 MG tablet Take 1 tablet by mouth 4 times daily as needed for Pain 22   Sirisha Courser, APRN - CNP   omeprazole (PRILOSEC) 20 MG delayed release capsule Take 20 mg by mouth nightly     Historical Provider, MD   Multiple Vitamins-Minerals (THERAPEUTIC MULTIVITAMIN-MINERALS) tablet Take 1 tablet by mouth daily    Historical Provider, MD   NONFORMULARY Take 1 capsule by mouth 2 times daily PROBIOTIC    Historical Provider, MD       Current medications:    Current Facility-Administered Medications   Medication Dose Route Frequency Provider Last Rate Last Admin    sodium chloride flush 0.9 % injection 5-40 mL  5-40 mL IntraVENous 2 times per day Garrison Sparks MD        sodium chloride flush 0.9 % injection 5-40 mL  5-40 mL IntraVENous PRN Garrison Sparks MD        0.9 % sodium chloride infusion  25 mL IntraVENous PRN Garrison Sparks MD        ciprofloxacin (CIPRO) IVPB 400 mg  400 mg IntraVENous On Call to MD Issa           Allergies: Allergies   Allergen Reactions    Zantac [Ranitidine] Rash       Problem List:  There is no problem list on file for this patient.       Past Medical History:        Diagnosis Date    GERD (gastroesophageal reflux disease)     Pancreatic cyst        Past Surgical History:        Procedure Laterality Date    CHOLECYSTECTOMY      GASTRIC FUNDOPLICATION  INGUINAL HERNIA REPAIR Left  approx    KNEE ARTHROSCOPY Right approx     UPPER GASTROINTESTINAL ENDOSCOPY      UPPER GASTROINTESTINAL ENDOSCOPY N/A 2021    ENDOSCOPIC ULTRASOUND performed by Anupama Hendrix DO at [address] [place] [address] N/A 2021    EGD performed by Anupama Hendrix DO at [facility] ENDOSCOPY       Social History:    Social History     Tobacco Use    Smoking status: Former Smoker     Packs/day: 1.00     Years: 25.00     Pack years: 25.00     Types: Cigarettes     Quit date:      Years since quittin.4    Smokeless tobacco: Never Used   Substance Use Topics    Alcohol use: Yes     Alcohol/week: 3.0 - 4.0 standard drinks     Types: 3 - 4 Cans of beer per week     Comment: occ                                Counseling given: Not Answered      Vital Signs (Current):   Vitals:    22 0851 22 1307   BP:  119/76   Pulse:  94   Resp:  18   Temp:  37.1 °C (98.8 °F)   TempSrc:  Temporal   SpO2:  96%   Weight: 163 lb (73.9 kg) 163 lb (73.9 kg)   Height: 5' 6\" (1.676 m) 5' 6\" (1.676 m)                                              BP Readings from Last 3 Encounters:   22 119/76   05/10/22 119/87   22 114/76       NPO Status: Time of last liquid consumption:                         Time of last solid consumption:                         Date of last liquid consumption: 22                        Date of last solid food consumption: 22    BMI:   Wt Readings from Last 3 Encounters:   22 163 lb (73.9 kg)   05/10/22 164 lb 3.2 oz (74.5 kg)   22 168 lb (76.2 kg)     Body mass index is 26.31 kg/m².     CBC:   Lab Results   Component Value Date    WBC 7.3 2020    RBC 4.17 2020    HGB 13.8 2020    HCT 40.5 2020    MCV 97.1 2020    RDW 11.8 2020     2020       CMP:   Lab Results   Component Value Date     2020    K 3.8 2020     2020    CO2 26 12/24/2020    BUN 11 12/24/2020    CREATININE 0.9 12/24/2020    GFRAA >60 12/24/2020    LABGLOM >60 12/24/2020    GLUCOSE 99 12/24/2020    PROT 7.3 12/24/2020    CALCIUM 9.2 12/24/2020    BILITOT 0.2 12/24/2020    ALKPHOS 81 12/24/2020    AST 15 12/24/2020    ALT 6 12/24/2020       POC Tests: No results for input(s): POCGLU, POCNA, POCK, POCCL, POCBUN, POCHEMO, POCHCT in the last 72 hours.     Coags: No results found for: PROTIME, INR, APTT    HCG (If Applicable): No results found for: PREGTESTUR, PREGSERUM, HCG, HCGQUANT     ABGs: No results found for: PHART, PO2ART, HHV9ASK, LXB4QND, BEART, C4CNWTQX     Type & Screen (If Applicable):  No results found for: LABABO, LABRH    Drug/Infectious Status (If Applicable):  No results found for: HIV, HEPCAB    COVID-19 Screening (If Applicable):   Lab Results   Component Value Date    COVID19 Not Detected 02/08/2021     EKG 12 Lead  Order: [id number]   Status: Final result     Visible to patient: Yes (seen)     Next appt: 06/09/2022 at 03:45 PM in Hepatobiliary Surgery Tonya Arrington MD)     0 Result Notes     Ref Range & Units 12/24/20 1714   Ventricular Rate BPM 84    Atrial Rate BPM 84    P-R Interval ms 154    QRS Duration ms 80    Q-T Interval ms 366    QTc Calculation (Bazett) ms 432    P Axis degrees 47    R Axis degrees 39    T Axis degrees 22    Resulting Agency  [facility]             Narrative & Impression    Normal sinus rhythm  Normal ECG  No previous ECGs available  Confirmed by Vero Mike ([phone]) on 12/25/2020 1:56:23 PM                 Anesthesia Evaluation  Patient summary reviewed and Nursing notes reviewed no history of anesthetic complications:   Airway: Mallampati: II  TM distance: >3 FB   Neck ROM: full  Mouth opening: > = 3 FB   Dental: normal exam     Comment: Pt denies any missing loose chipped or cracked teeth      Pulmonary: breath sounds clear to auscultation                            ROS comment: Former smoker   Cardiovascular:Negative CV ROS          ECG reviewed  Rhythm: regular  Rate: normal           Beta Blocker:  Not on Beta Blocker         Neuro/Psych:   Negative Neuro/Psych ROS              GI/Hepatic/Renal:   (+) GERD:,          ROS comment: Pancreatic cyst.   Endo/Other: Negative Endo/Other ROS             Pt had no PAT visit       Abdominal:             Vascular: negative vascular ROS. Other Findings:        EXAMINATION:   MRI OF THE ABDOMEN WITH AND WITHOUT CONTRAST AND MRCP 2/4/2022 12:02 pm       TECHNIQUE:   Multiplanar multisequence MRI of the abdomen was performed with and without   the administration of intravenous contrast.  After initial T2 axial and   coronal images, thick slab, thin slab and 3D coronal MRCP sequences were   obtained without the administration of intravenous contrast.  MIP images are   provided for review. COMPARISON:   12/24/2020 CTA chest       HISTORY:   ORDERING SYSTEM PROVIDED HISTORY: IPMN (intraductal papillary mucinous   neoplasm)   TECHNOLOGIST PROVIDED HISTORY:   Reason for exam:->2cm BD-IPMN in the pancreatic body - evaluate for growth   and worrisome features   What reading provider will be dictating this exam?->CRC       FINDINGS:   Liver: Liver enhances normally. No focal hepatic lesion. Portal vein is   patent. Spleen: No evidence of splenomegaly. Nonenhancing cyst in the mid spleen   measuring approximately 5 mm. No enhancing splenic lesion. Adrenals: No focal adrenal nodule. Kidneys: Kidneys enhance symmetrically. No hydronephrosis. No focal renal   cortical lesion. Gallbladder: Status post cholecystectomy. Bile Ducts: No intrahepatic or extrahepatic biliary ductal dilatation. Pancreatic Duct/pancreas: There is a complex cystic lesion in the body/tail   of the pancreas measuring approximately 13 x 9 mm in size. Findings suggest   a mucinous cystic pancreatic neoplasm. There is subtle enhancing septi   noted.   No evidence of discrete solid enhancing component. There is no   evidence of pancreatic ductal dilatation. No evidence of definite   communication to the pancreatic duct. No evidence of solid enhancing   pancreatic mass. Other:  No ascites. No lymphadenopathy. Visualized bowel demonstrates no   acute abnormality. Osseous structures demonstrate no acute abnormality. Impression   9 x 13 mm complex cystic lesion in the body/tail of the pancreas with   enhancing septi but no nodular enhancement. No evidence of main duct or side   branch duct pancreatic ductal dilatation. Findings suggesting mucinous   pancreatic cystic neoplasm such as a mucinous cystadenoma or mucinous   cystadenocarcinoma. ERCP with brushings should be considered. No solid enhancing pancreatic mass or pancreatic ductal dilatation. No biliary ductal dilatation. Anesthesia Plan      MAC     ASA 3       Induction: intravenous. Anesthetic plan and risks discussed with patient. Plan discussed with attending. LENORE Jackson Mai - CRNA   6/1/2022      Patient seen and examined, chart reviewed, agree with above findings. Anesthetic plan, risks, benefits, alternatives, and personnel involved discussed with patient and her . Patient verbalized an understanding and agreed to proceed. NPO status confirmed. Anesthetic plan discussed with care team members and agreed upon.     Reynolds Bosworth, DO   6/1/2022  2:34 PM

## 2022-06-02 NOTE — ANESTHESIA POSTPROCEDURE EVALUATION
Department of Anesthesiology  Postprocedure Note    Patient: Feliberto Runner  MRN: [id number]  YOB: 1975  Date of evaluation: 6/2/2022  Time:  5:49 AM     Procedure Summary     Date: 06/01/22 Room / Location: [facility] 03 / [facility]    Anesthesia Start: 1504 Anesthesia Stop: 7582    Procedures:       EGD ESOPHAGOGASTRODUODENOSCOPY ULTRASOUND (N/A )      EGD W/EUS FNA (N/A ) Diagnosis: (PANCREAS CYST)    Surgeons: Louis Marr MD Responsible Provider: Teofilo Dexter DO    Anesthesia Type: MAC ASA Status: 3          Anesthesia Type: MAC    Mary Phase I: Mary Score: 10    Mary Phase II: Mary Score: 10    Last vitals: Reviewed and per EMR flowsheets.        Anesthesia Post Evaluation    Patient location during evaluation: PACU  Patient participation: complete - patient participated  Level of consciousness: awake and alert  Pain score: 1  Airway patency: patent  Nausea & Vomiting: no nausea and no vomiting  Complications: no  Cardiovascular status: hemodynamically stable  Respiratory status: acceptable  Hydration status: euvolemic

## 2022-06-09 ENCOUNTER — SCHEDULED TELEPHONE ENCOUNTER (OUTPATIENT)
Dept: HEMATOLOGY | Age: 47
End: 2022-06-09

## 2022-06-09 DIAGNOSIS — D13.6 MUCINOUS CYSTADENOMA OF HEAD OF PANCREAS: Primary | ICD-10-CM

## 2022-06-09 PROCEDURE — 99024 POSTOP FOLLOW-UP VISIT: CPT | Performed by: TRANSPLANT SURGERY

## 2022-06-09 NOTE — PROGRESS NOTES
Hepatobiliary and Pancreatic Surgery Progress Note    CC: Pancreatic body cyst    Subjective: Patient states that she still has some epigastric abdominal pain. She states that she has minimal bloating. She denies any diarrhea. She does have an intact fundoplication. She had an EUS with Dr. Analilia Machado in early 2020 which showed a BD-IPMN with benign features. ASSESSMENT: 13mm complex pancreatic body mucinous neoplasm vs. BD-IPMN (favor BD-IPMN)    PLAN:    - EUS showed 1.1cm with thick septations but otherwise no additional worrisome features  - MRI is February 2023    Thank you for the consultation and allowing me to take part in Ms. Walls's care.     Please send a copy of my note to Dr. Leti Bryan    Electronically signed by Brando Browning MD on 6/9/2022 at 4:16 PM

## 2022-08-20 ENCOUNTER — HOSPITAL ENCOUNTER (OUTPATIENT)
Dept: GENERAL RADIOLOGY | Age: 47
Discharge: HOME | End: 2022-08-22
Payer: COMMERCIAL

## 2022-08-20 DIAGNOSIS — Z12.31 VISIT FOR SCREENING MAMMOGRAM: ICD-10-CM

## 2022-08-20 PROCEDURE — 77063 BREAST TOMOSYNTHESIS BI: CPT

## 2022-12-09 ENCOUNTER — OFFICE VISIT (OUTPATIENT)
Dept: FAMILY MEDICINE CLINIC | Age: 47
End: 2022-12-09

## 2022-12-09 VITALS
HEART RATE: 73 BPM | HEIGHT: 66 IN | OXYGEN SATURATION: 98 % | TEMPERATURE: 97.4 F | SYSTOLIC BLOOD PRESSURE: 134 MMHG | DIASTOLIC BLOOD PRESSURE: 89 MMHG | RESPIRATION RATE: 17 BRPM | BODY MASS INDEX: 26.2 KG/M2 | WEIGHT: 163 LBS

## 2022-12-09 DIAGNOSIS — R39.9 UTI SYMPTOMS: Primary | ICD-10-CM

## 2022-12-09 LAB
BILIRUBIN, POC: NORMAL
BLOOD URINE, POC: NORMAL
CLARITY, POC: CLEAR
COLOR, POC: CLEAR
GLUCOSE URINE, POC: NORMAL
KETONES, POC: NORMAL
LEUKOCYTE EST, POC: NORMAL
NITRITE, POC: NORMAL
PH, POC: 6.5
PROTEIN, POC: NORMAL
SPECIFIC GRAVITY, POC: 1.01
UROBILINOGEN, POC: 0.2

## 2022-12-09 SDOH — ECONOMIC STABILITY: FOOD INSECURITY: WITHIN THE PAST 12 MONTHS, THE FOOD YOU BOUGHT JUST DIDN'T LAST AND YOU DIDN'T HAVE MONEY TO GET MORE.: NEVER TRUE

## 2022-12-09 SDOH — ECONOMIC STABILITY: FOOD INSECURITY: WITHIN THE PAST 12 MONTHS, YOU WORRIED THAT YOUR FOOD WOULD RUN OUT BEFORE YOU GOT MONEY TO BUY MORE.: NEVER TRUE

## 2022-12-09 ASSESSMENT — PATIENT HEALTH QUESTIONNAIRE - PHQ9
SUM OF ALL RESPONSES TO PHQ QUESTIONS 1-9: 0
1. LITTLE INTEREST OR PLEASURE IN DOING THINGS: 0
SUM OF ALL RESPONSES TO PHQ9 QUESTIONS 1 & 2: 0
DEPRESSION UNABLE TO ASSESS: FUNCTIONAL CAPACITY MOTIVATION LIMITS ACCURACY
2. FEELING DOWN, DEPRESSED OR HOPELESS: 0

## 2022-12-09 ASSESSMENT — SOCIAL DETERMINANTS OF HEALTH (SDOH): HOW HARD IS IT FOR YOU TO PAY FOR THE VERY BASICS LIKE FOOD, HOUSING, MEDICAL CARE, AND HEATING?: NOT HARD AT ALL

## 2022-12-09 NOTE — PROGRESS NOTES
22  Stas Toscano : 1975 Sex: female  Age 50 y.o. Subjective:  Chief Complaint   Patient presents with    Back Pain     Lower center started Monday        HPI:   Stas Toscano , 50 y.o. female presents to the clinic for evaluation of intermittent back pain x 5 days. The patient has no known injury and no history of back problems. Pt states the pain is worse with sitting. The patient has taken tylenol for symptoms. The patient reports same symptoms over time. Pt denies any radiating pain, loss of sensation or strength of extremities, bowel/bladder incontinence, and hematuria. The patient also denies headache, fever, chest pain, abdominal pain, shortness of breath, and nausea / vomiting / diarrhea. ROS:   Unless otherwise stated in this report the patient's positive and negative responses for review of systems for constitutional, eyes, ENT, cardiovascular, respiratory, gastrointestinal, neurological, , musculoskeletal, and integument systems and related systems to the presenting problem are either stated in the history of present illness or were not pertinent or were negative for the symptoms and/or complaints related to the presenting medical problem. Positives and pertinent negatives as per HPI. All others reviewed and are negative.       PMH:     Past Medical History:   Diagnosis Date    GERD (gastroesophageal reflux disease)     Pancreatic cyst        Past Surgical History:   Procedure Laterality Date    CHOLECYSTECTOMY      GASTRIC FUNDOPLICATION      INGUINAL HERNIA REPAIR Left  approx    KNEE ARTHROSCOPY Right approx     UPPER GASTROINTESTINAL ENDOSCOPY      UPPER GASTROINTESTINAL ENDOSCOPY N/A 2021    ENDOSCOPIC ULTRASOUND performed by Nick Camejo DO at [address] N/A 2021    EGD performed by Nick Camejo DO at [address] N/A 2022    EGD ESOPHAGOGASTRODUODENOSCOPY ULTRASOUND performed by Cody Evans MD at [address] [facility] [address] N/A 2022    EGD W/EUS FNA performed by Cody Evans MD at [facility] ENDOSCOPY       Family History   Problem Relation Age of Onset    Heart Disease Mother     Prostate Cancer Maternal Grandfather     Irritable Bowel Syndrome Child        Medications:     Current Outpatient Medications:     ondansetron (ZOFRAN ODT) 4 MG disintegrating tablet, Take 1 tablet by mouth every 8 hours as needed for Nausea or Vomiting, Disp: 10 tablet, Rfl: 0    acetaminophen (TYLENOL) 500 MG tablet, Take 1 tablet by mouth 4 times daily as needed for Pain, Disp: 20 tablet, Rfl: 1    omeprazole (PRILOSEC) 20 MG delayed release capsule, Take 20 mg by mouth nightly , Disp: , Rfl:     Multiple Vitamins-Minerals (THERAPEUTIC MULTIVITAMIN-MINERALS) tablet, Take 1 tablet by mouth daily, Disp: , Rfl:     NONFORMULARY, Take 1 capsule by mouth 2 times daily PROBIOTIC, Disp: , Rfl:     Allergies: Allergies   Allergen Reactions    Zantac [Ranitidine] Rash       Social History:     Social History     Tobacco Use    Smoking status: Former     Packs/day: 1.00     Years: 25.00     Pack years: 25.00     Types: Cigarettes     Quit date:      Years since quittin.9    Smokeless tobacco: Never   Vaping Use    Vaping Use: Never used   Substance Use Topics    Alcohol use: Yes     Alcohol/week: 3.0 - 4.0 standard drinks     Types: 3 - 4 Cans of beer per week     Comment: occ    Drug use: Never       Physical Exam:     Vitals:    22 1650   BP: 134/89   Pulse: 73   Resp: 17   Temp: 97.4 °F (36.3 °C)   TempSrc: Temporal   SpO2: 98%   Weight: 163 lb (73.9 kg)   Height: 5' 6\" (1.676 m)       Physical Exam (PE)   Constitutional: Alert, development consistent with age. HENT:      Head: Normocephalic.       Right Ear: External ear normal.      Left Ear: External ear normal.      Nose: Normal.      Mouth/Throat:     Mouth: Mucous membranes are moist.      Pharynx: Oropharynx is clear. Eyes: Pupils: Pupils are equal, round, and reactive to light. Neck: Normal ROM. Supple. Cardiovascular: Heart RRR without pathologic murmurs or gallops. Pulmonary: Respiratory effort normal.  Normal breath sounds. Abdomen: Soft, nontender, normal bowel sounds. Back: Tenderness: TTP over lumbar region with no appreciable midline tenderness. Swelling: No edema. Range of Motion: WDL            CVA Tenderness: No CVA tenderness bilaterally. Straight leg raising:  Negative                       Skin: No bruising, redness, abrasions, or rashes. Distal Function:              Motor deficit: Strength 5/5 in LE's bilaterally. Sensory deficit: Distal sensation intact. Pulse deficit: Distal pulses 2+ and bounding. Calf Tenderness:  No bilateral calf tenderness. Negative Jacob's sign bilaterally               Reflexes: Intact at knee and achilles bilaterally. Gait:  No antalgia noted. Skin:  Normal turgor. Warm, dry, without visible rash. Neurological:  Alert and oriented. Motor functions intact. Psychiatric: Mood and Affect: Mood normal. Behavior: Behavior normal.    Testing:   (All laboratory and radiology results have been personally reviewed by myself)  Labs:  Results for orders placed or performed in visit on 12/09/22   POCT Urinalysis no Micro   Result Value Ref Range    Color, UA clear     Clarity, UA clear     Glucose, UA POC neg     Bilirubin, UA neg     Ketones, UA neg     Spec Grav, UA 1.015     Blood, UA POC neg     pH, UA 6.5     Protein, UA POC neg     Urobilinogen, UA 0.2     Leukocytes, UA neg     Nitrite, UA neg        Imaging: All Radiology results interpreted by Radiologist unless otherwise noted. No orders to display       Assessment / Plan:   The patient's vitals, allergies, medications, and past medical history have been reviewed. [facility] was seen today for [facility] pain.     Diagnoses and all orders for this visit:    UTI symptoms  -     Culture, Urine  -     POCT Urinalysis no Micro      - Disposition: home    - Educational material printed for patient's review and were included in patient instructions. After Visit Summary was given to patient at the end of visit. - Patient is advised to minimize or avoid activities that exacerbate symptoms, rest, ice, and/or moist heat for additional relief. Discussed other symptomatic treatments with the patient today. The patient is to schedule a follow-up with PCP in the next 2-3 days for reevaluation. Red flag symptoms were also discussed with the patient today. If symptoms worsen the patient is to go directly to the emergency department for reevaluation and treatment. Pt verbalizes understanding and is in agreement with plan of care. All questions answered. SIGNATURE: LENORE Anderson CNP      *NOTE: This report was transcribed using voice recognition software. Every effort was made to ensure accuracy; however, inadvertent computerized transcription errors may be present.

## 2022-12-11 LAB — URINE CULTURE, ROUTINE: NORMAL

## 2023-02-15 ENCOUNTER — TELEPHONE (OUTPATIENT)
Dept: HEMATOLOGY | Age: 48
End: 2023-02-15

## 2023-02-15 NOTE — TELEPHONE ENCOUNTER
I called and scheduled patient for her MRI at [facility] on 2/23/23 at 11:00am.  I called patient and left a VM to call the office back to get her MRI appt information and to make her follow up with Dr. Fabiene Brittle.     Electronically signed by Gabriel Diehl RN on 2/15/2023 at 9:00 AM

## 2023-02-15 NOTE — TELEPHONE ENCOUNTER
The patient called back and I gave her the time, date, location and instructions for her mri.  While on the phone I made the patient her follow up appt for [facility] on 03/16/2023 at 2:45 pm. Directions to the office were given to the patient and she verbalized understanding  Electronically signed by Tarsha Marin MA on 2/15/2023 at 9:14 AM

## 2023-02-23 ENCOUNTER — HOSPITAL ENCOUNTER (OUTPATIENT)
Dept: MRI IMAGING | Age: 48
Discharge: HOME | End: 2023-02-25
Payer: COMMERCIAL

## 2023-02-23 DIAGNOSIS — D13.6 MUCINOUS CYSTADENOMA OF HEAD OF PANCREAS: ICD-10-CM

## 2023-02-23 PROCEDURE — A9577 INJ MULTIHANCE: HCPCS | Performed by: RADIOLOGY

## 2023-02-23 PROCEDURE — 74183 MRI ABD W/O CNTR FLWD CNTR: CPT

## 2023-02-23 PROCEDURE — 6360000004 HC RX CONTRAST MEDICATION: Performed by: RADIOLOGY

## 2023-02-23 RX ADMIN — GADOBENATE DIMEGLUMINE 14 ML: 529 INJECTION, SOLUTION INTRAVENOUS at 11:48

## 2023-03-16 ENCOUNTER — OFFICE VISIT (OUTPATIENT)
Dept: HEMATOLOGY | Age: 48
End: 2023-03-16
Payer: COMMERCIAL

## 2023-03-16 VITALS
DIASTOLIC BLOOD PRESSURE: 81 MMHG | HEART RATE: 86 BPM | BODY MASS INDEX: 27.48 KG/M2 | TEMPERATURE: 98.4 F | RESPIRATION RATE: 16 BRPM | SYSTOLIC BLOOD PRESSURE: 116 MMHG | HEIGHT: 66 IN | OXYGEN SATURATION: 97 % | WEIGHT: 171 LBS

## 2023-03-16 DIAGNOSIS — D27.9 MUCINOUS CYSTADENOMA: ICD-10-CM

## 2023-03-16 DIAGNOSIS — Z09 FOLLOW-UP EXAM: Primary | ICD-10-CM

## 2023-03-16 PROCEDURE — 99212 OFFICE O/P EST SF 10 MIN: CPT | Performed by: TRANSPLANT SURGERY

## 2023-03-16 NOTE — PROGRESS NOTES
Hepatobiliary and Pancreatic Surgery Progress Note    CC: Pancreatic body cyst    Subjective:  She had an EUS with Dr. 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 She then saw Dr. Raina Hickey 2022 - which showed thick septations with calcifications. 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  - given the mucinous cystadenoma - resection is warranted once it reaches 2cm  - will continue surveillance imaging  - MRI in 1 year     20 Minutes of which greater than 50% was spent counseling or coordinating her care. Thank you Dr. Gayle Jones for the consultation and allowing me to take part in Ms. Walls's care.     Please send a copy of my note to Dr. Kerry Edward    Electronically signed by Tawana Kc MD on 3/16/2023 at 3:08 PM

## 2023-08-25 ENCOUNTER — HOSPITAL ENCOUNTER (OUTPATIENT)
Dept: GENERAL RADIOLOGY | Age: 48
Discharge: HOME | End: 2023-08-25
Payer: COMMERCIAL

## 2023-08-25 VITALS — HEIGHT: 66 IN | BODY MASS INDEX: 27.48 KG/M2 | WEIGHT: 171 LBS

## 2023-08-25 DIAGNOSIS — Z12.31 VISIT FOR SCREENING MAMMOGRAM: ICD-10-CM

## 2023-08-25 PROCEDURE — 77063 BREAST TOMOSYNTHESIS BI: CPT

## 2024-01-04 ENCOUNTER — TELEPHONE (OUTPATIENT)
Dept: HEMATOLOGY | Age: 49
End: 2024-01-04

## 2024-01-04 NOTE — TELEPHONE ENCOUNTER
Pt rescheduled her 1 year MRI. Called and LVM for Maira to return call to clinic to review instructions and schedule a follow up appt with Dr. Hunt.

## 2024-01-08 NOTE — TELEPHONE ENCOUNTER
Pt called and scheduled her follow up for 02/23/24 at 8:00 am ANYAH  Electronically signed by Racheal Laguerre MA on 1/8/2024 at 3:42 PM

## 2024-02-17 ENCOUNTER — HOSPITAL ENCOUNTER (OUTPATIENT)
Dept: MRI IMAGING | Age: 49
End: 2024-02-17
Attending: TRANSPLANT SURGERY
Payer: COMMERCIAL

## 2024-02-17 DIAGNOSIS — D27.9 MUCINOUS CYSTADENOMA: ICD-10-CM

## 2024-02-17 PROCEDURE — 74183 MRI ABD W/O CNTR FLWD CNTR: CPT

## 2024-02-17 PROCEDURE — A9579 GAD-BASE MR CONTRAST NOS,1ML: HCPCS | Performed by: RADIOLOGY

## 2024-02-17 PROCEDURE — 6360000004 HC RX CONTRAST MEDICATION: Performed by: RADIOLOGY

## 2024-02-17 RX ADMIN — GADOTERIDOL 16 ML: 279.3 INJECTION, SOLUTION INTRAVENOUS at 08:25

## 2024-02-23 ENCOUNTER — OFFICE VISIT (OUTPATIENT)
Dept: HEMATOLOGY | Age: 49
End: 2024-02-23
Payer: COMMERCIAL

## 2024-02-23 VITALS
HEIGHT: 66 IN | DIASTOLIC BLOOD PRESSURE: 84 MMHG | OXYGEN SATURATION: 99 % | SYSTOLIC BLOOD PRESSURE: 123 MMHG | BODY MASS INDEX: 27.97 KG/M2 | RESPIRATION RATE: 15 BRPM | HEART RATE: 84 BPM | TEMPERATURE: 98.8 F | WEIGHT: 174 LBS

## 2024-02-23 DIAGNOSIS — Z09 FOLLOW-UP EXAM: ICD-10-CM

## 2024-02-23 DIAGNOSIS — D13.6 MUCINOUS CYSTADENOMA OF PANCREAS: Primary | ICD-10-CM

## 2024-02-23 PROCEDURE — 99213 OFFICE O/P EST LOW 20 MIN: CPT | Performed by: TRANSPLANT SURGERY

## 2024-02-23 PROCEDURE — 99212 OFFICE O/P EST SF 10 MIN: CPT | Performed by: TRANSPLANT SURGERY

## 2024-02-23 RX ORDER — FUROSEMIDE 20 MG/1
20 TABLET ORAL DAILY
COMMUNITY

## 2024-02-23 NOTE — PROGRESS NOTES
Hepatobiliary and Pancreatic Surgery Progress Note    CC: Pancreatic body cyst    Subjective:  She had an EUS with Dr. 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  She then saw Dr. Alcaraz 2022 - which showed thick septations with calcifications.  She denies any abdominal pain that is related to ovulation or her menstrual cycle.  She does have some vague abdominal discomfort.    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  - given the mucinous cystadenoma - resection is warranted once it reaches 2cm  -She continues to have no growth to date  - will continue surveillance imaging  - MRI in 1 year     20 Minutes of which greater than 50% was spent counseling or coordinating her care.    Thank you Dr. Guidry for the consultation and allowing me to take part in Ms. Walls's care.    Please send a copy of my note to Dr. ARMANDO Guidry    Electronically signed by Shorty Hunt MD on 2/23/2024 at 8:03 AM

## 2024-04-19 ENCOUNTER — HOSPITAL ENCOUNTER (OUTPATIENT)
Dept: GENERAL RADIOLOGY | Age: 49
End: 2024-04-19
Payer: COMMERCIAL

## 2024-04-19 ENCOUNTER — HOSPITAL ENCOUNTER (OUTPATIENT)
Age: 49
Discharge: HOME | End: 2024-04-19
Payer: COMMERCIAL

## 2024-04-19 DIAGNOSIS — S23.41XA SPRAIN OF COSTAL CARTILAGE, INITIAL ENCOUNTER: ICD-10-CM

## 2024-04-19 DIAGNOSIS — S20.212A CONTUSION OF LEFT FRONT WALL OF THORAX, INITIAL ENCOUNTER: ICD-10-CM

## 2024-04-19 PROCEDURE — 71100 X-RAY EXAM RIBS UNI 2 VIEWS: CPT

## 2024-09-21 ENCOUNTER — HOSPITAL ENCOUNTER (OUTPATIENT)
Dept: GENERAL RADIOLOGY | Age: 49
Discharge: HOME | End: 2024-09-23
Payer: COMMERCIAL

## 2024-09-21 VITALS — HEIGHT: 65 IN | WEIGHT: 175 LBS | BODY MASS INDEX: 29.16 KG/M2

## 2024-09-21 DIAGNOSIS — Z12.31 VISIT FOR SCREENING MAMMOGRAM: ICD-10-CM

## 2024-09-21 PROCEDURE — 77063 BREAST TOMOSYNTHESIS BI: CPT

## 2025-01-14 ENCOUNTER — TELEPHONE (OUTPATIENT)
Dept: HEMATOLOGY | Age: 50
End: 2025-01-14

## 2025-01-14 NOTE — TELEPHONE ENCOUNTER
I scheduled patient for her MRI at [facility] on 2/12/25 at 8:30am.  I called and left a VM that I scheduled her 1 year MRI in February and I asked her to call the office back to get the MRI appt information and instructions and to make a follow up appt.    Electronically signed by Luz Elena Hay RN on 1/14/2025 at 1:36 PM      Patient called back and she was given the time,date and location for her MRI.  I instructed her to arrive at 8:00am, NPO after midnight and wear  no metal or jewelry.  I made her a follow up on 2/28/25 at 8:00am at B clinic at [facility].  She verbalized understanding and she confirmed these appts.    Electronically signed by Luz Elena Hay RN on 1/14/2025 at 3:37 PM

## 2025-02-13 ENCOUNTER — HOSPITAL ENCOUNTER (OUTPATIENT)
Dept: MRI IMAGING | Age: 50
Discharge: HOME | End: 2025-02-15
Attending: TRANSPLANT SURGERY
Payer: COMMERCIAL

## 2025-02-13 DIAGNOSIS — D13.6 MUCINOUS CYSTADENOMA OF PANCREAS: ICD-10-CM

## 2025-02-13 PROCEDURE — A9579 GAD-BASE MR CONTRAST NOS,1ML: HCPCS | Performed by: RADIOLOGY

## 2025-02-13 PROCEDURE — 74183 MRI ABD W/O CNTR FLWD CNTR: CPT

## 2025-02-13 PROCEDURE — 6360000004 HC RX CONTRAST MEDICATION: Performed by: RADIOLOGY

## 2025-02-13 RX ADMIN — GADOTERIDOL 16 ML: 279.3 INJECTION, SOLUTION INTRAVENOUS at 08:16

## 2025-02-28 ENCOUNTER — OFFICE VISIT (OUTPATIENT)
Dept: HEMATOLOGY | Age: 50
End: 2025-02-28
Payer: COMMERCIAL

## 2025-02-28 VITALS
SYSTOLIC BLOOD PRESSURE: 124 MMHG | DIASTOLIC BLOOD PRESSURE: 79 MMHG | BODY MASS INDEX: 29.46 KG/M2 | RESPIRATION RATE: 15 BRPM | HEIGHT: 65 IN | TEMPERATURE: 97.9 F | HEART RATE: 94 BPM | WEIGHT: 176.8 LBS | OXYGEN SATURATION: 99 %

## 2025-02-28 DIAGNOSIS — Z09 FOLLOW-UP EXAM: ICD-10-CM

## 2025-02-28 DIAGNOSIS — D13.6 MUCINOUS CYSTADENOMA OF PANCREAS: Primary | ICD-10-CM

## 2025-02-28 PROCEDURE — 99212 OFFICE O/P EST SF 10 MIN: CPT | Performed by: TRANSPLANT SURGERY

## 2025-02-28 PROCEDURE — 99213 OFFICE O/P EST LOW 20 MIN: CPT | Performed by: TRANSPLANT SURGERY

## 2025-02-28 NOTE — PROGRESS NOTES
Hepatobiliary and Pancreatic Surgery Progress Note    CC: Follow-up branch duct IPMN    Subjective: EUS in 2022 which showed : In the pancreatic body, a 1.1cm cystic lesion was appreciated.  The cystic lesion was irregular in shape divided into multiple compartments with thick septations. One of the septations was noted to be calcified with associated shadowing. No clear ductal communication was present. No associated mural nodule was appreciated. There were no solid components to the cyst.   Patient states that she is doing well.  She has some GI symptoms that are off and on but not related to anything specific.    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  - given the mucinous cystadenoma - resection is warranted once it reaches 2cm  -She continues to have no growth to date  - will continue surveillance imaging  - We discussed that she does not have any significant worrisome features which is reassuring.  We discussed what worrisome features are.  - MRI in 1 year     20 Minutes of which greater than 50% was spent counseling or coordinating her care.    Thank you Dr. Guidry for the consultation and allowing me to take part in Ms. Walls's care.    Please send a copy of my note to Dr.S. Chao Hunt MD 2/28/2025 7:51 AM

## 2025-05-02 ENCOUNTER — HOSPITAL ENCOUNTER (OUTPATIENT)
Dept: GENERAL RADIOLOGY | Age: 50
Discharge: HOME | End: 2025-05-02
Attending: FAMILY MEDICINE
Payer: COMMERCIAL

## 2025-05-02 DIAGNOSIS — S33.5XXA SPRAIN OF LIGAMENTS OF LUMBAR SPINE, INITIAL ENCOUNTER: ICD-10-CM

## 2025-05-02 DIAGNOSIS — S33.6XXA SPRAIN OF SACROILIAC JOINT, INITIAL ENCOUNTER: ICD-10-CM

## 2025-05-02 PROCEDURE — 72110 X-RAY EXAM L-2 SPINE 4/>VWS: CPT

## (undated) DEVICE — DEFENDO AIR WATER SUCTION AND BIOPSY VALVE KIT FOR  OLYMPUS: Brand: DEFENDO AIR/WATER/SUCTION AND BIOPSY VALVE

## (undated) DEVICE — ENDOSCOPIC ULTRASOUND ASPIRATION NEEDLE: Brand: EXPECT SLIMLINE SL

## (undated) DEVICE — GAUZE,SPONGE,POST-OP,4X3,STRL,LF: Brand: MEDLINE

## (undated) DEVICE — SPONGE GZ W4XL4IN RAYON POLY FILL CVR W/ NONWOVEN FAB

## (undated) DEVICE — CANNULA NSL ORAL AD FOR CAPNOFLEX CO2 O2 AIRLFE

## (undated) DEVICE — BITEBLOCK 54FR W/ DENT RIM BLOX

## (undated) DEVICE — Device: Brand: BALLOON3